# Patient Record
Sex: MALE | Race: AMERICAN INDIAN OR ALASKA NATIVE
[De-identification: names, ages, dates, MRNs, and addresses within clinical notes are randomized per-mention and may not be internally consistent; named-entity substitution may affect disease eponyms.]

---

## 2017-11-29 ENCOUNTER — HOSPITAL ENCOUNTER (INPATIENT)
Dept: HOSPITAL 5 - ED | Age: 47
LOS: 3 days | Discharge: HOME HEALTH | DRG: 286 | End: 2017-12-02
Attending: INTERNAL MEDICINE | Admitting: INTERNAL MEDICINE
Payer: COMMERCIAL

## 2017-11-29 DIAGNOSIS — E11.22: ICD-10-CM

## 2017-11-29 DIAGNOSIS — N17.0: ICD-10-CM

## 2017-11-29 DIAGNOSIS — Z79.899: ICD-10-CM
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
## 2017-11-29 DIAGNOSIS — N30.00: ICD-10-CM

## 2017-11-29 DIAGNOSIS — Z88.5: ICD-10-CM

## 2017-11-29 DIAGNOSIS — K86.1: ICD-10-CM

## 2017-11-29 DIAGNOSIS — T82.594A: Primary | ICD-10-CM

## 2017-11-29 DIAGNOSIS — Z71.51: ICD-10-CM

## 2017-11-29 DIAGNOSIS — A41.9: ICD-10-CM

## 2017-11-29 DIAGNOSIS — E11.65: ICD-10-CM

## 2017-11-29 DIAGNOSIS — Z95.4: ICD-10-CM
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
## 2017-11-29 DIAGNOSIS — E11.43: ICD-10-CM

## 2017-11-29 DIAGNOSIS — K65.8: ICD-10-CM

## 2017-11-29 DIAGNOSIS — K31.84: ICD-10-CM

## 2017-11-29 DIAGNOSIS — E43: ICD-10-CM

## 2017-11-29 DIAGNOSIS — D69.6: ICD-10-CM

## 2017-11-29 DIAGNOSIS — E88.09: ICD-10-CM

## 2017-11-29 DIAGNOSIS — J15.6: ICD-10-CM

## 2017-11-29 DIAGNOSIS — Z90.49: ICD-10-CM

## 2017-11-29 DIAGNOSIS — G89.4: ICD-10-CM

## 2017-11-29 DIAGNOSIS — D64.9: ICD-10-CM

## 2017-11-29 DIAGNOSIS — N18.9: ICD-10-CM

## 2017-11-29 LAB
ALBUMIN SERPL-MCNC: 2.7 G/DL (ref 3.9–5)
ALBUMIN/GLOB SERPL: 0.8 %
ALP SERPL-CCNC: 374 UNITS/L (ref 35–129)
ALT SERPL-CCNC: 21 UNITS/L (ref 7–56)
ANION GAP SERPL CALC-SCNC: 23 MMOL/L
ANISOCYTOSIS BLD QL SMEAR: (no result)
APTT BLD: 34.8 SEC. (ref 24.2–36.6)
BILIRUB SERPL-MCNC: 0.8 MG/DL (ref 0.1–1.2)
BLASTOCYTES % (MANUAL): 0 %
BUN SERPL-MCNC: 34 MG/DL (ref 9–20)
BUN/CREAT SERPL: 18 %
CALCIUM SERPL-MCNC: 7.7 MG/DL (ref 8.4–10.2)
CHLORIDE SERPL-SCNC: 101 MMOL/L (ref 98–107)
CK SERPL-CCNC: 29 UNITS/L (ref 55–170)
CO2 SERPL-SCNC: 20 MMOL/L (ref 22–30)
GLUCOSE SERPL-MCNC: 119 MG/DL (ref 75–100)
HCT VFR BLD CALC: 24.5 % (ref 35.5–45.6)
HGB BLD-MCNC: 7.8 GM/DL (ref 11.8–15.2)
INR PPP: 1.34 (ref 0.87–1.13)
LIPASE SERPL-CCNC: 4 UNITS/L (ref 13–60)
MCH RBC QN AUTO: 27 PG (ref 28–32)
MCHC RBC AUTO-ENTMCNC: 32 % (ref 32–34)
MCV RBC AUTO: 84 FL (ref 84–94)
OVALOCYTES BLD QL SMEAR: (no result)
PLATELET # BLD: 127 K/MM3 (ref 140–440)
POIKILOCYTOSIS BLD QL SMEAR: (no result)
POTASSIUM SERPL-SCNC: 4.5 MMOL/L (ref 3.6–5)
PROT SERPL-MCNC: 6.2 G/DL (ref 6.3–8.2)
RBC # BLD AUTO: 2.9 M/MM3 (ref 3.65–5.03)
SODIUM SERPL-SCNC: 139 MMOL/L (ref 137–145)
TOTAL CELLS COUNTED PERCENT: 0
WBC # BLD AUTO: 18.8 K/MM3 (ref 4.5–11)

## 2017-11-29 PROCEDURE — 80048 BASIC METABOLIC PNL TOTAL CA: CPT

## 2017-11-29 PROCEDURE — 93010 ELECTROCARDIOGRAM REPORT: CPT

## 2017-11-29 PROCEDURE — 89050 BODY FLUID CELL COUNT: CPT

## 2017-11-29 PROCEDURE — 81001 URINALYSIS AUTO W/SCOPE: CPT

## 2017-11-29 PROCEDURE — P9047 ALBUMIN (HUMAN), 25%, 50ML: HCPCS

## 2017-11-29 PROCEDURE — 85007 BL SMEAR W/DIFF WBC COUNT: CPT

## 2017-11-29 PROCEDURE — 71010: CPT

## 2017-11-29 PROCEDURE — C1751 CATH, INF, PER/CENT/MIDLINE: HCPCS

## 2017-11-29 PROCEDURE — 96375 TX/PRO/DX INJ NEW DRUG ADDON: CPT

## 2017-11-29 PROCEDURE — 96376 TX/PRO/DX INJ SAME DRUG ADON: CPT

## 2017-11-29 PROCEDURE — 84156 ASSAY OF PROTEIN URINE: CPT

## 2017-11-29 PROCEDURE — 96361 HYDRATE IV INFUSION ADD-ON: CPT

## 2017-11-29 PROCEDURE — 86140 C-REACTIVE PROTEIN: CPT

## 2017-11-29 PROCEDURE — 84100 ASSAY OF PHOSPHORUS: CPT

## 2017-11-29 PROCEDURE — 76770 US EXAM ABDO BACK WALL COMP: CPT

## 2017-11-29 PROCEDURE — 86225 DNA ANTIBODY NATIVE: CPT

## 2017-11-29 PROCEDURE — 86160 COMPLEMENT ANTIGEN: CPT

## 2017-11-29 PROCEDURE — 84484 ASSAY OF TROPONIN QUANT: CPT

## 2017-11-29 PROCEDURE — C1769 GUIDE WIRE: HCPCS

## 2017-11-29 PROCEDURE — 36415 COLL VENOUS BLD VENIPUNCTURE: CPT

## 2017-11-29 PROCEDURE — 84165 PROTEIN E-PHORESIS SERUM: CPT

## 2017-11-29 PROCEDURE — 85025 COMPLETE CBC W/AUTO DIFF WBC: CPT

## 2017-11-29 PROCEDURE — 83036 HEMOGLOBIN GLYCOSYLATED A1C: CPT

## 2017-11-29 PROCEDURE — 85610 PROTHROMBIN TIME: CPT

## 2017-11-29 PROCEDURE — 82553 CREATINE MB FRACTION: CPT

## 2017-11-29 PROCEDURE — 82140 ASSAY OF AMMONIA: CPT

## 2017-11-29 PROCEDURE — 83735 ASSAY OF MAGNESIUM: CPT

## 2017-11-29 PROCEDURE — 82570 ASSAY OF URINE CREATININE: CPT

## 2017-11-29 PROCEDURE — 87806 HIV AG W/HIV1&2 ANTB W/OPTIC: CPT

## 2017-11-29 PROCEDURE — 80053 COMPREHEN METABOLIC PANEL: CPT

## 2017-11-29 PROCEDURE — 96374 THER/PROPH/DIAG INJ IV PUSH: CPT

## 2017-11-29 PROCEDURE — 93005 ELECTROCARDIOGRAM TRACING: CPT

## 2017-11-29 PROCEDURE — 80202 ASSAY OF VANCOMYCIN: CPT

## 2017-11-29 PROCEDURE — 83690 ASSAY OF LIPASE: CPT

## 2017-11-29 PROCEDURE — 85730 THROMBOPLASTIN TIME PARTIAL: CPT

## 2017-11-29 PROCEDURE — 80074 ACUTE HEPATITIS PANEL: CPT

## 2017-11-29 PROCEDURE — 85027 COMPLETE CBC AUTOMATED: CPT

## 2017-11-29 PROCEDURE — 84295 ASSAY OF SERUM SODIUM: CPT

## 2017-11-29 PROCEDURE — 74176 CT ABD & PELVIS W/O CONTRAST: CPT

## 2017-11-29 PROCEDURE — 82565 ASSAY OF CREATININE: CPT

## 2017-11-29 PROCEDURE — 82962 GLUCOSE BLOOD TEST: CPT

## 2017-11-29 PROCEDURE — 36581 REPLACE TUNNELED CV CATH: CPT

## 2017-11-29 PROCEDURE — 87040 BLOOD CULTURE FOR BACTERIA: CPT

## 2017-11-29 PROCEDURE — 82550 ASSAY OF CK (CPK): CPT

## 2017-11-29 PROCEDURE — 84300 ASSAY OF URINE SODIUM: CPT

## 2017-11-29 PROCEDURE — 77001 FLUOROGUIDE FOR VEIN DEVICE: CPT

## 2017-11-29 PROCEDURE — 93306 TTE W/DOPPLER COMPLETE: CPT

## 2017-11-29 RX ADMIN — HEPARIN SODIUM SCH: 5000 INJECTION, SOLUTION INTRAVENOUS; SUBCUTANEOUS at 22:45

## 2017-11-29 RX ADMIN — PIPERACILLIN SODIUM AND TAZOBACTAM SODIUM SCH MLS/HR: 3; .375 INJECTION, POWDER, LYOPHILIZED, FOR SOLUTION INTRAVENOUS at 17:58

## 2017-11-29 RX ADMIN — ONDANSETRON PRN MG: 2 INJECTION INTRAMUSCULAR; INTRAVENOUS at 18:17

## 2017-11-29 RX ADMIN — HYDROMORPHONE HYDROCHLORIDE PRN MG: 2 INJECTION, SOLUTION INTRAMUSCULAR; INTRAVENOUS; SUBCUTANEOUS at 17:57

## 2017-11-29 RX ADMIN — HYDROMORPHONE HYDROCHLORIDE PRN MG: 2 INJECTION, SOLUTION INTRAMUSCULAR; INTRAVENOUS; SUBCUTANEOUS at 22:45

## 2017-11-29 NOTE — XRAY REPORT
PORTABLE CHEST



INDICATION: Fever, cough.



COMPARISON: 6/24/2012



FINDINGS: Portable, frontal chest radiograph again demonstrates 

sternotomy wires and prosthetic heart valve with a new right sided 

central catheter tip about the cavoatrial junction.  Top normal heart 

size with grossly normal mediastinal and hilar contours.  Predominantly 

bibasilar pulmonary opacities/infiltrates also new.  Clear upper lungs 

without significant pleural effusions or CHF.  EKG leads.  Intact bones.



CONCLUSION: New bibasilar opacities/infiltrates and right sided central 

catheter in this patient with stable prosthetic heart valve, as 

described.



Thank you for the opportunity to participate in this patient's care.

## 2017-11-29 NOTE — CAT SCAN REPORT
FINAL REPORT



PROCEDURE:  CT ABDOMEN PELVIS WO CON



TECHNIQUE:  Computerized axial tomography of the abdomen and

pelvis was performed without intravenous contrast. This study is

performed without intravascular contrast material and its

sensitivity for abdominal and pelvic pathology, including

neoplasms, inflammation, abscess, free fluid, thrombosis,

arterial dissection and infarction, is reduced compared with a

contrast enhanced study. 



HISTORY:  diffuse abdominal pain, fever 



COMPARISON:  No prior studies are available for comparison.



FINDINGS:  

Lower Lung fields: There are diffuse patchy alveolar densities

present in both lower lobes.. Small bilateral pleural effusions

appears to be present. These patchy alveolar densities suggest

pneumonia. Some of the densities could represent atelectasis.

Sternotomy wires are visualized. There appears to be a small

pericardial effusion. 



Upper Abdomen: Percutaneous gastrostomy tube visualized. The

inflated into the balloon is located in the lumen of the stomach.

Gallbladder appears to be surgically absent. The unenhanced

images of the liver are unremarkable. The adrenal glands are

unremarkable. The spleen does not appear to be enlarged. There

appear to be multiple surgical clips involving portions of the

pancreatic tail and body of the pancreas. The edges of the

pancreas are ill-defined. There is diffuse increased density

throughout the retroperitoneum surrounding the pancreas and

extending throughout the mesentery. This appears represent

diffuse edema. I cannot exclude pancreatitis. 



Kidneys, Ureters and Urinary bladder: No abnormalities are

identified.



Retroperitoneum: Abdominal aorta appears normal.



Nonspecific subcentimeter lymph nodes are seen in the

retroperitoneum. No pathologically enlarged lymph nodes are

identified. 



Bowel: Nonspecific bowel gas pattern present. No evidence of

bowel obstruction. Small amount of ascites is scattered

throughout the leaves of the mesentery. No free intraperitoneal

gas is seen. No focal bowel loop abnormalities are identified.

The appendix is not visualized.



Reproductive organs: Prostate gland does not appear to be

enlarged.



Other: No acute bony abnormalities are identified. 







IMPRESSION:  





Diffuse increased density seen in the mesentery as well as a

small amount of scattered ascites and ill definition of the

pancreas. Numerous surgical clips appear to be associated with

the pancreatic body and tail. Consider pancreatitis. No

pseudocyst is visualized.



Small bilateral pleural effusions  present.



Diffuse patchy alveolar densities present in both lower lobes

suggesting pneumonia. Some of this could represent atelectasis.



Percutaneous gastrostomy tube in place as described.



Prior thoracotomy. 



.

## 2017-11-29 NOTE — EMERGENCY DEPARTMENT REPORT
HPI





- General


Chief Complaint: Fever


Time Seen by Provider: 11/29/17 12:37





- HPI


HPI: 





Room 22





The patient is a 47-year-old male presenting with a chief complaint of 

abdominal pain.  The patient states for the past 3 days he is periumbilical 

abdominal pain as being constant and sharp in nature.  Patient admits to nausea 

and vomiting and occasional diarrhea.  Patient missed a subjective fever and 

occasional cough is sometimes productive of sputum.  Patient denies dysuria or 

hematuria.  Patient states he was admitted at [facility] 

and discharged 4 days ago on home IV antibiotics.  Patient states he is 

uncertain of the type of infection he was being treated for





Location: Gastrointestinal system


Duration: 3 days


Quality: Pain


Severity: Moderate


Modifying factors: [see above]


Context: [see above]


Mode of transportation: [not driving]





ED Past Medical Hx





- Past Medical History


Hx Hypertension: Yes


Hx Diabetes: Yes (insulin pump)


Additional medical history: gastroparesis





- Surgical History


Hx Open Heart Surgery: Yes


Hx Cholecystectomy: Yes


Additional Surgical History: abdominal surgery for malrotation, tricuspid valve 

replacement 2008, cervical fusion, partial gastrectomy "because of 

gastroparesis."





- Family History


Family history: no significant





- Social History


Smoking Status: Former Smoker (none 15 years)


Substance Use Type: None (denies illicit drug use)





- Medications


Home Medications: 


 Home Medications











 Medication  Instructions  Recorded  Confirmed  Last Taken  Type


 


Pregabalin [Lyrica] 75 mg PO QDAY 01/26/17 03/18/17 Unknown History


 


Scopolamine [Transderm-Scop] 1 each TD ONCE 01/26/17 03/18/17 Unknown History


 


Pantoprazole [Protonix TAB] 40 mg PO QDAY #30 tablet 01/29/17 03/18/17 Unknown 

Rx


 


Diazepam Tab [Valium] 5 mg PO QID PRN #30 tablet 03/21/17  Unknown Rx


 


Hydromorphone HCl [Dilaudid] 4 mg PO Q6H #30 tablet 03/21/17  Unknown Rx


 


Insulin Detemir [Levemir] 15 units SUB-Q QHS #1 vial 03/21/17  Unknown Rx


 


Insulin Regular, Human [HumuLIN R] See Protocol SUB-Q ACHS #1 vial 03/21/17  

Unknown Rx


 


Lisinopril [Zestril TAB] 10 mg PO QDAY #30 tab 03/21/17 03/18/17 Unknown Rx


 


Ondansetron [Zofran ODT TAB] 4 mg PO Q8HR #90 tab.rapdis 03/21/17  Unknown Rx


 


Promethazine [Phenergan SUPPOS] 80 mg LA DAILY #60 supp.rect 03/21/17  Unknown 

Rx














ED Review of Systems


ROS: 


Stated complaint: ABD PAIN


Other details as noted in HPI





Constitutional: fever (subjective)


Eyes: denies: eye pain


ENT: denies: ear pain


Cardiovascular: denies: chest pain


Gastrointestinal: abdominal pain, nausea, vomiting, diarrhea


Genitourinary: denies: dysuria, hematuria


Musculoskeletal: denies: back pain


Neurological: denies: headache





Physical Exam





- Physical Exam


Vital Signs: 


 Vital Signs











  11/29/17





  12:11


 


Temperature 103.1 F H


 


Pulse Rate 118 H


 


Respiratory 28 H





Rate 


 


Blood Pressure 143/75


 


Blood Pressure 143/75





[Left] 


 


O2 Sat by Pulse 99





Oximetry 











Physical Exam: 





GENERAL: The patient is well-developed well-nourished male lying on stretcher 

not appearing to be in acute distress. []


HEENT: Normocephalic.  Atraumatic.  Extraocular motions are intact.  Patient 

has moist mucous membranes.


NECK: Supple.  Trachea midline


CHEST/LUNGS: Clear to auscultation.  There is no respiratory distress noted.


HEART/CARDIOVASCULAR: Regular.  There is tachycardia.  There is no gallop rub 

or murmur.


ABDOMEN: Abdomen is soft, with tenderness to palpation in the right upper 

quadrant and right lower quadrant.  There is no rebound or guarding.  Patient 

has normal bowel sounds.  There is no abdominal distention.


SKIN: There is no rash.  There is no edema.  There is no diaphoresis.


NEURO: The patient is awake, alert, and oriented.  The patient is cooperative.  

The patient has normal speech 


MUSCULOSKELETAL:  There is no evidence of acute injury.





ED Course


 Vital Signs











  11/29/17





  12:11


 


Temperature 103.1 F H


 


Pulse Rate 118 H


 


Respiratory 28 H





Rate 


 


Blood Pressure 143/75


 


Blood Pressure 143/75





[Left] 


 


O2 Sat by Pulse 99





Oximetry 














ED Medical Decision Making





- Lab Data


Result diagrams: 


 11/29/17 13:06





 11/29/17 13:06





 Laboratory Tests











  11/29/17 11/29/17 11/29/17





  13:05 13:05 13:05


 


WBC   


 


RBC   


 


Hgb   


 


Hct   


 


MCV   


 


MCH   


 


MCHC   


 


RDW   


 


Plt Count   


 


Add Manual Diff   


 


Total Counted   


 


Seg Neutrophils %   


 


Seg Neuts % (Manual)   


 


Band Neutrophils %   


 


Lymphocytes % (Manual)   


 


Reactive Lymphs % (Man)   


 


Monocytes % (Manual)   


 


Eosinophils % (Manual)   


 


Basophils % (Manual)   


 


Metamyelocytes %   


 


Myelocytes %   


 


Promyelocytes %   


 


Blast Cells %   


 


Nucleated RBC %   


 


Seg Neutrophils # Man   


 


Band Neutrophils #   


 


Lymphocytes # (Manual)   


 


Abs React Lymphs (Man)   


 


Monocytes # (Manual)   


 


Eosinophils # (Manual)   


 


Basophils # (Manual)   


 


Metamyelocytes #   


 


Myelocytes #   


 


Promyelocytes #   


 


Blast Cells #   


 


WBC Morphology   


 


Hypersegmented Neuts   


 


Hyposegmented Neuts   


 


Hypogranular Neuts   


 


Smudge Cells   


 


Toxic Granulation   


 


Toxic Vacuolation   


 


Dohle Bodies   


 


Pelger-Huet Anomaly   


 


Natalie Rods   


 


Platelet Estimate   


 


Clumped Platelets   


 


Plt Clumps, EDTA   


 


Large Platelets   


 


Giant Platelets   


 


Platelet Satelliting   


 


Plt Morphology Comment   


 


RBC Morphology   


 


Dimorphic RBCs   


 


Polychromasia   


 


Hypochromasia   


 


Poikilocytosis   


 


Anisocytosis   


 


Microcytosis   


 


Macrocytosis   


 


Spherocytes   


 


Pappenheimer Bodies   


 


Sickle Cells   


 


Target Cells   


 


Tear Drop Cells   


 


Ovalocytes   


 


Helmet Cells   


 


[place] Bodies   


 


[place]ot Rings   


 


Tammy Cells   


 


Bite Cells   


 


Crenated Cell   


 


Elliptocytes   


 


Acanthocytes (Spur)   


 


Rouleaux   


 


Hemoglobin C Crystals   


 


Schistocytes   


 


Malaria parasites   


 


Luis Bodies   


 


Hem Pathologist Commnt   


 


PT  17.2 H  


 


INR  1.34 H  


 


APTT  34.8  


 


Sodium   


 


Potassium   


 


Chloride   


 


Carbon Dioxide   


 


Anion Gap   


 


BUN   


 


Creatinine   


 


Estimated GFR   


 


BUN/Creatinine Ratio   


 


Glucose   


 


POC Glucose   


 


Lactic Acid   


 


Calcium   


 


Total Bilirubin   


 


AST   


 


ALT   


 


Alkaline Phosphatase   


 


Total Creatine Kinase   29 L 


 


CK-MB (CK-2)   < 1.0 


 


CK-MB (CK-2) Rel Index   3.4 


 


Troponin T    0.021


 


Total Protein   


 


Albumin   


 


Albumin/Globulin Ratio   


 


Lipase   4 L 


 


Random Vancomycin   














  11/29/17 11/29/17 11/29/17





  13:05 13:06 13:06


 


WBC   18.8 H 


 


RBC   2.90 L 


 


Hgb   7.8 L 


 


Hct   24.5 L 


 


MCV   84 


 


MCH   27 L 


 


MCHC   32 


 


RDW   18.6 H 


 


Plt Count   127 L 


 


Add Manual Diff   Complete 


 


Total Counted   100 


 


Seg Neutrophils %   Np 


 


Seg Neuts % (Manual)   93.0 H 


 


Band Neutrophils %   3.0 


 


Lymphocytes % (Manual)   4.0 L 


 


Reactive Lymphs % (Man)   0 


 


Monocytes % (Manual)   0 


 


Eosinophils % (Manual)   0 


 


Basophils % (Manual)   0 


 


Metamyelocytes %   0 


 


Myelocytes %   0 


 


Promyelocytes %   0 


 


Blast Cells %   0 


 


Nucleated RBC %   Not Reportable 


 


Seg Neutrophils # Man   17.5 H 


 


Band Neutrophils #   0.6 


 


Lymphocytes # (Manual)   0.8 L 


 


Abs React Lymphs (Man)   0.0 


 


Monocytes # (Manual)   0.0 


 


Eosinophils # (Manual)   0.0 


 


Basophils # (Manual)   0.0 


 


Metamyelocytes #   0.0 


 


Myelocytes #   0.0 


 


Promyelocytes #   0.0 


 


Blast Cells #   0.0 


 


WBC Morphology   Not Reportable 


 


Hypersegmented Neuts   Not Reportable 


 


Hyposegmented Neuts   Not Reportable 


 


Hypogranular Neuts   Not Reportable 


 


Smudge Cells   Not Reportable 


 


Toxic Granulation   Not Reportable 


 


Toxic Vacuolation   Not Reportable 


 


Dohle Bodies   Not Reportable 


 


Pelger-Huet Anomaly   Not Reportable 


 


Natalie Rods   Not Reportable 


 


Platelet Estimate   Appears normal 


 


Clumped Platelets   Not Reportable 


 


Plt Clumps, EDTA   Not Reportable 


 


Large Platelets   Not Reportable 


 


Giant Platelets   Not Reportable 


 


Platelet Satelliting   Not Reportable 


 


Plt Morphology Comment   Not Reportable 


 


RBC Morphology   Not Reportable 


 


Dimorphic RBCs   Not Reportable 


 


Polychromasia   Few 


 


Hypochromasia   Not Reportable 


 


Poikilocytosis   1+ 


 


Anisocytosis   1+ 


 


Microcytosis   Not Reportable 


 


Macrocytosis   Not Reportable 


 


Spherocytes   Not Reportable 


 


Pappenheimer Bodies   Not Reportable 


 


Sickle Cells   Not Reportable 


 


Target Cells   Not Reportable 


 


Tear Drop Cells   Few 


 


Ovalocytes   1+ 


 


Helmet Cells   Rare 


 


[place] Bodies   Not Reportable 


 


Cabot Rings   Not Reportable 


 


Tammy Cells   Few 


 


Bite Cells   Not Reportable 


 


Crenated Cell   Not Reportable 


 


Elliptocytes   Few 


 


Acanthocytes (Spur)   Not Reportable 


 


Rouleaux   Not Reportable 


 


Hemoglobin C Crystals   Not Reportable 


 


Schistocytes   Rare 


 


Malaria parasites   Not Reportable 


 


Luis Bodies   Not Reportable 


 


Hem Pathologist Commnt   No 


 


PT   


 


INR   


 


APTT   


 


Sodium    139


 


Potassium    4.5


 


Chloride    101.0


 


Carbon Dioxide    20 L


 


Anion Gap    23


 


BUN    34 H


 


Creatinine    1.9 H


 


Estimated GFR    46


 


BUN/Creatinine Ratio    18


 


Glucose    119 H


 


POC Glucose   


 


Lactic Acid   


 


Calcium    7.7 L


 


Total Bilirubin    0.80


 


AST    25


 


ALT    21


 


Alkaline Phosphatase    374 H


 


Total Creatine Kinase   


 


CK-MB (CK-2)   


 


CK-MB (CK-2) Rel Index   


 


Troponin T   


 


Total Protein    6.2 L


 


Albumin    2.7 L


 


Albumin/Globulin Ratio    0.8


 


Lipase   


 


Random Vancomycin  10.1  














  11/29/17 11/29/17





  14:16 15:21


 


WBC  


 


RBC  


 


Hgb  


 


Hct  


 


MCV  


 


MCH  


 


MCHC  


 


RDW  


 


Plt Count  


 


Add Manual Diff  


 


Total Counted  


 


Seg Neutrophils %  


 


Seg Neuts % (Manual)  


 


Band Neutrophils %  


 


Lymphocytes % (Manual)  


 


Reactive Lymphs % (Man)  


 


Monocytes % (Manual)  


 


Eosinophils % (Manual)  


 


Basophils % (Manual)  


 


Metamyelocytes %  


 


Myelocytes %  


 


Promyelocytes %  


 


Blast Cells %  


 


Nucleated RBC %  


 


Seg Neutrophils # Man  


 


Band Neutrophils #  


 


Lymphocytes # (Manual)  


 


Abs React Lymphs (Man)  


 


Monocytes # (Manual)  


 


Eosinophils # (Manual)  


 


Basophils # (Manual)  


 


Metamyelocytes #  


 


Myelocytes #  


 


Promyelocytes #  


 


Blast Cells #  


 


WBC Morphology  


 


Hypersegmented Neuts  


 


Hyposegmented Neuts  


 


Hypogranular Neuts  


 


Smudge Cells  


 


Toxic Granulation  


 


Toxic Vacuolation  


 


Dohle Bodies  


 


Pelger-Huet Anomaly  


 


Natalie Rods  


 


Platelet Estimate  


 


Clumped Platelets  


 


Plt Clumps, EDTA  


 


Large Platelets  


 


Giant Platelets  


 


Platelet Satelliting  


 


Plt Morphology Comment  


 


RBC Morphology  


 


Dimorphic RBCs  


 


Polychromasia  


 


Hypochromasia  


 


Poikilocytosis  


 


Anisocytosis  


 


Microcytosis  


 


Macrocytosis  


 


Spherocytes  


 


Pappenheimer Bodies  


 


Sickle Cells  


 


Target Cells  


 


Tear Drop Cells  


 


Ovalocytes  


 


Helmet Cells  


 


[place] Bodies  


 


Cabot Rings  


 


[place] Cells  


 


Bite Cells  


 


Crenated Cell  


 


Elliptocytes  


 


Acanthocytes (Spur)  


 


Rouleaux  


 


Hemoglobin C Crystals  


 


Schistocytes  


 


Malaria parasites  


 


Luis Bodies  


 


Hem Pathologist Commnt  


 


PT  


 


INR  


 


APTT  


 


Sodium  


 


Potassium  


 


Chloride  


 


Carbon Dioxide  


 


Anion Gap  


 


BUN  


 


Creatinine  


 


Estimated GFR  


 


BUN/Creatinine Ratio  


 


Glucose  


 


POC Glucose  118 H 


 


Lactic Acid   1.20


 


Calcium  


 


Total Bilirubin  


 


AST  


 


ALT  


 


Alkaline Phosphatase  


 


Total Creatine Kinase  


 


CK-MB (CK-2)  


 


CK-MB (CK-2) Rel Index  


 


Troponin T  


 


Total Protein  


 


Albumin  


 


Albumin/Globulin Ratio  


 


Lipase  


 


Random Vancomycin  














- EKG Data


-: EKG Interpreted by Me


EKG shows normal: sinus rhythm


Rate: tachycardia (115 bpm)





- EKG Data


When compared to previous EKG there are: previous EKG unavailable


Interpretation: nonspecific ST-T wave nadir (T-wave inversion in lead 3, aVF, V3, 

V4)





- Radiology Data


Radiology results: image reviewed (chest x-ray, CT abdomen and pelvis)


interpreted by me: 





Chest x-ray-bibasilar infiltrates, no pneumothorax





CT abdomen and pelvis (discussed with radiologist)-no acute intra-abdominal 

findings.  Diffuse ascites, bibasilar infiltrates





- Differential Diagnosis


pyelonephritis, gastroparesis, intra-abdominal abscess,


Critical care attestation.: 


If time is entered above; I have spent that time in minutes in the direct care 

of this critically ill patient, excluding procedure time.








ED Disposition


Clinical Impression: 


 Nausea & vomiting, Gastroparesis, Pneumonia, Fever, Tachycardia, Renal 

insufficiency





Disposition: DC-09 OP ADMIT IP TO THIS HOSP


Is pt being admited?: Yes


Does the pt Need Aspirin: No


Condition: Stable


Instructions:  Bacterial Pneumonia (ED)


Referrals: 


PRIMARY CARE,MD [Primary Care Provider] - 3-5 Days


Time of Disposition: 15:57 (hospitalist Dr Pete notified)

## 2017-11-30 LAB
ANION GAP SERPL CALC-SCNC: 20 MMOL/L
BUN SERPL-MCNC: 34 MG/DL (ref 9–20)
BUN/CREAT SERPL: 18 %
CALCIUM SERPL-MCNC: 7.6 MG/DL (ref 8.4–10.2)
CHLORIDE SERPL-SCNC: 102 MMOL/L (ref 98–107)
CO2 SERPL-SCNC: 21 MMOL/L (ref 22–30)
GLUCOSE SERPL-MCNC: 259 MG/DL (ref 75–100)
HCT VFR BLD CALC: 24.1 % (ref 35.5–45.6)
HGB BLD-MCNC: 7.8 GM/DL (ref 11.8–15.2)
MAGNESIUM SERPL-MCNC: 1.8 MG/DL (ref 1.7–2.3)
MCH RBC QN AUTO: 27 PG (ref 28–32)
MCHC RBC AUTO-ENTMCNC: 32 % (ref 32–34)
MCV RBC AUTO: 85 FL (ref 84–94)
PHOSPHATE SERPL-MCNC: 3.5 MG/DL (ref 2.5–4.5)
PLATELET # BLD: 121 K/MM3 (ref 140–440)
POTASSIUM SERPL-SCNC: 4.9 MMOL/L (ref 3.6–5)
RBC # BLD AUTO: 2.84 M/MM3 (ref 3.65–5.03)
SODIUM SERPL-SCNC: 138 MMOL/L (ref 137–145)
WBC # BLD AUTO: 11 K/MM3 (ref 4.5–11)

## 2017-11-30 PROCEDURE — 0DH63UZ INSERTION OF FEEDING DEVICE INTO STOMACH, PERCUTANEOUS APPROACH: ICD-10-PCS | Performed by: INTERNAL MEDICINE

## 2017-11-30 RX ADMIN — PIPERACILLIN SODIUM AND TAZOBACTAM SODIUM SCH MLS/HR: 3; .375 INJECTION, POWDER, LYOPHILIZED, FOR SOLUTION INTRAVENOUS at 15:09

## 2017-11-30 RX ADMIN — LISINOPRIL SCH MG: 5 TABLET ORAL at 09:48

## 2017-11-30 RX ADMIN — VANCOMYCIN HYDROCHLORIDE SCH MLS/HR: 1 INJECTION, POWDER, LYOPHILIZED, FOR SOLUTION INTRAVENOUS at 17:41

## 2017-11-30 RX ADMIN — HYDROMORPHONE HYDROCHLORIDE PRN MG: 2 INJECTION, SOLUTION INTRAMUSCULAR; INTRAVENOUS; SUBCUTANEOUS at 15:08

## 2017-11-30 RX ADMIN — SODIUM CHLORIDE SCH MLS/HR: 0.9 INJECTION, SOLUTION INTRAVENOUS at 05:12

## 2017-11-30 RX ADMIN — HYDROMORPHONE HYDROCHLORIDE PRN MG: 2 INJECTION, SOLUTION INTRAMUSCULAR; INTRAVENOUS; SUBCUTANEOUS at 02:32

## 2017-11-30 RX ADMIN — INSULIN DETEMIR SCH UNITS: 100 INJECTION, SOLUTION SUBCUTANEOUS at 22:47

## 2017-11-30 RX ADMIN — HEPARIN SODIUM SCH: 5000 INJECTION, SOLUTION INTRAVENOUS; SUBCUTANEOUS at 10:00

## 2017-11-30 RX ADMIN — HEPARIN SODIUM SCH: 5000 INJECTION, SOLUTION INTRAVENOUS; SUBCUTANEOUS at 22:47

## 2017-11-30 RX ADMIN — HYDROMORPHONE HYDROCHLORIDE PRN MG: 2 INJECTION, SOLUTION INTRAMUSCULAR; INTRAVENOUS; SUBCUTANEOUS at 08:07

## 2017-11-30 RX ADMIN — PIPERACILLIN SODIUM AND TAZOBACTAM SODIUM SCH MLS/HR: 3; .375 INJECTION, POWDER, LYOPHILIZED, FOR SOLUTION INTRAVENOUS at 20:42

## 2017-11-30 RX ADMIN — PIPERACILLIN SODIUM AND TAZOBACTAM SODIUM SCH MLS/HR: 3; .375 INJECTION, POWDER, LYOPHILIZED, FOR SOLUTION INTRAVENOUS at 00:55

## 2017-11-30 RX ADMIN — HYDROMORPHONE HYDROCHLORIDE PRN MG: 2 INJECTION, SOLUTION INTRAMUSCULAR; INTRAVENOUS; SUBCUTANEOUS at 12:16

## 2017-11-30 RX ADMIN — PIPERACILLIN SODIUM AND TAZOBACTAM SODIUM SCH MLS/HR: 3; .375 INJECTION, POWDER, LYOPHILIZED, FOR SOLUTION INTRAVENOUS at 05:16

## 2017-11-30 RX ADMIN — PREGABALIN SCH MG: 75 CAPSULE ORAL at 09:48

## 2017-11-30 RX ADMIN — HYDROMORPHONE HYDROCHLORIDE PRN MG: 2 INJECTION, SOLUTION INTRAMUSCULAR; INTRAVENOUS; SUBCUTANEOUS at 20:36

## 2017-11-30 RX ADMIN — PIPERACILLIN SODIUM AND TAZOBACTAM SODIUM SCH: 3; .375 INJECTION, POWDER, LYOPHILIZED, FOR SOLUTION INTRAVENOUS at 23:56

## 2017-11-30 RX ADMIN — HYDROMORPHONE HYDROCHLORIDE PRN MG: 2 INJECTION, SOLUTION INTRAMUSCULAR; INTRAVENOUS; SUBCUTANEOUS at 17:28

## 2017-11-30 RX ADMIN — SODIUM CHLORIDE SCH MLS/HR: 0.9 INJECTION, SOLUTION INTRAVENOUS at 09:47

## 2017-11-30 NOTE — GASTROENTEROLOGY CONSULTATION
History of Present Illness





- Reason for Consult


Consult date: 11/30/17


abdominal pain


Requesting physician: ISA PIERRE





- History of Present Illness


Mr Solis is a 46 yo aam with h/o poorly controlled diabetes and gastroparesis 

who presents with worsening abdominal pain, n/v, and diffuse body aches.  

Patient reports initially having symptoms of URI 2-3 weeks ago.  He reports 

having chronic abdominal pain for over a decade with associated n/v.  He has 

symptoms at baseline, but has periods of worsening symptoms.  Of note, patient 

recently was at [facility] for DKA and sepsis (2/2 tpn line induced bacteremia).  

Previously plans for gastroparesis included TPN, feeding tube (which he has), 

dm management, and to f/u with specialist at [facility].  He reports abd pain being 

diffuse, worsened with meals.  He denies constipation or bowel habit changes.  

Of note, pt reports having abdominal surgery at [facility] [facility] several months ago with 

"parts of intestine removed" although he is unable to provide further details 

regarding this.





Past History


Past Medical History: diabetes (30+ years), hypertension, other (diabetic 

gastroparesis)


Past Surgical History: No surgical history


Social history: other (he is an ex-smoker.  Denies alcohol abuse or drug abuse)


Family history: other (patient states that his father also had kidney disease 

but not on dialysis)





Medications and Allergies


 Allergies











Allergy/AdvReac Type Severity Reaction Status Date / Time


 


codeine Allergy  Unknown Verified 01/26/17 12:42











 Home Medications











 Medication  Instructions  Recorded  Confirmed  Last Taken  Type


 


Pregabalin [Lyrica] 75 mg PO QDAY 01/26/17 11/29/17 Unknown History


 


Diazepam Tab [Valium] 5 mg PO QID PRN #30 tablet 03/21/17 11/29/17 Unknown Rx


 


Hydromorphone HCl [Dilaudid] 4 mg PO Q6H #30 tablet 03/21/17 11/29/17 Unknown Rx


 


Insulin Regular, Human [HumuLIN R] See Protocol SUB-Q ACHS #1 vial 03/21/17 11/ 29/17 Unknown Rx


 


Ondansetron [Zofran ODT TAB] 4 mg PO Q8HR #90 tab.rapdis 03/21/17 11/29/17 

Unknown Rx


 


Ciprofloxacin HCl [Ciprofloxacin 500 mg PO Q12H 11/29/17 11/29/17 Unknown 

History





TAB]     


 


Lisinopril [Zestril TAB] 2.5 mg PO QDAY 11/29/17 11/29/17 Unknown History











Active Meds: 


Active Medications





Acetaminophen (Tylenol)  650 mg PO Q4H PRN


   PRN Reason: For Pain/Fever/Headache


Dextrose (D50w (25gm) Syringe)  50 ml IV PRN PRN


   PRN Reason: Hypoglycemia


Diazepam (Valium)  5 mg PO QID PRN


   PRN Reason: Anxiety


Heparin Sodium (Porcine) (Heparin)  5,000 unit SUB-Q Q12HR On license of [facility]


   Last Admin: 11/30/17 10:00 Dose:  Not Given


Hydromorphone HCl (Dilaudid)  1 mg IV Q3H PRN


   PRN Reason: Pain , Severe (7-10)


   Last Admin: 11/30/17 15:08 Dose:  1 mg


Piperacillin Sod/Tazobactam Sod (Zosyn/Ns 3.375gm/50ml)  3.375 gm in 50 mls @ 

100 mls/hr IV Q6H On license of [facility]


   PRN Reason: Protocol


   Last Admin: 11/30/17 15:09 Dose:  100 mls/hr


Amino Acids/Electrolytes/Dextrose (Tpn Adult)  1,800 mls @ 75 mls/hr IV DAILY@

2000 On license of [facility]


   PRN Reason: Protocol


   Stop: 12/01/17 19:59


Vancomycin HCl (Vancomycin/Ns 1 Gm/250 Ml)  1 gm in 250 mls @ 125 mls/hr IV 

Q12H On license of [facility]


Insulin Detemir (Levemir)  12 units SUB-Q QHS On license of [facility]


Insulin Human Regular (Novolin R)  0 units SUB-Q ACHS On license of [facility]


   PRN Reason: Protocol


   Last Admin: 11/30/17 12:51 Dose:  4 units


Lisinopril (Zestril)  2.5 mg PO QDAY On license of [facility]


   Last Admin: 11/30/17 09:48 Dose:  2.5 mg


Ondansetron HCl (Zofran)  4 mg IV Q6H PRN


   PRN Reason: Nausea And Vomiting


   Last Admin: 11/29/17 18:17 Dose:  4 mg


Pregabalin (Lyrica)  75 mg PO QDAY On license of [facility]


   Last Admin: 11/30/17 09:48 Dose:  75 mg


Vancomycin HCl (Vancomycin Pharmacy To Dose)  1 each IV PKCONSULT On license of [facility]


   PRN Reason: Protocol











Review of Systems





- Review of Systems


All systems: negative (per hpi)





Exam





- Constitutional


Vital Signs: 


 











Temp Pulse Resp BP Pulse Ox


 


 97.2 F L  80   18   111/69   97 


 


 11/30/17 15:06  11/30/17 15:06  11/30/17 15:06  11/30/17 15:06  11/30/17 15:06











General appearance: no acute distress





- EENT


Eyes: PERRL, EOM intact


ENT: hearing intact, clear oral mucosa





- Respiratory


Respiratory effort: normal


Respiratory: bilateral: CTA





- Cardiovascular


Rhythm: regular


Heart Sounds: Present: S1 & S2


Extremity abnormal: edema





- Gastrointestinal


General gastrointestinal: Present: soft, tender (diffuse ttp, no r/g), 

distended (mild distention), hypoactive bowel sounds, other (+ surgical scars, 

g tube site c/d/i)





- Neurologic


Neurological: alert and oriented x3





- Psychiatric


Psychiatric: appropriate mood/affect





- Labs


CBC & Chem 7: 


 11/30/17 06:12





 11/30/17 06:12


Lab Results: 


 Laboratory Results - last 24 hr











  11/29/17 11/29/17 11/30/17





  13:06 22:49 05:51


 


WBC   


 


RBC   


 


Hgb   


 


Hct   


 


MCV   


 


MCH   


 


MCHC   


 


RDW   


 


Plt Count   


 


Sodium   


 


Potassium   


 


Chloride   


 


Carbon Dioxide   


 


Anion Gap   


 


BUN   


 


Creatinine   


 


Estimated GFR   


 


BUN/Creatinine Ratio   


 


Glucose   


 


POC Glucose   221 H  279 H


 


Hemoglobin A1c   


 


Lactic Acid  1.10  


 


Calcium   


 


Phosphorus   


 


Magnesium   














  11/30/17 11/30/17 11/30/17





  06:12 06:12 06:12


 


WBC  11.0  


 


RBC  2.84 L  


 


Hgb  7.8 L  


 


Hct  24.1 L  


 


MCV  85  


 


MCH  27 L  


 


MCHC  32  


 


RDW  19.2 H  


 


Plt Count  121 L  


 


Sodium   138 


 


Potassium   4.9 


 


Chloride   102.0 


 


Carbon Dioxide   21 L 


 


Anion Gap   20 


 


BUN   34 H 


 


Creatinine   1.9 H 


 


Estimated GFR   46 


 


BUN/Creatinine Ratio   18 


 


Glucose   259 H 


 


POC Glucose   


 


Hemoglobin A1c    8.5 H


 


Lactic Acid   


 


Calcium   7.6 L 


 


Phosphorus   


 


Magnesium   














  11/30/17 11/30/17





  06:15 12:09


 


WBC  


 


RBC  


 


Hgb  


 


Hct  


 


MCV  


 


MCH  


 


MCHC  


 


RDW  


 


Plt Count  


 


Sodium  


 


Potassium  


 


Chloride  


 


Carbon Dioxide  


 


Anion Gap  


 


BUN  


 


Creatinine  


 


Estimated GFR  


 


BUN/Creatinine Ratio  


 


Glucose  


 


POC Glucose   276 H


 


Hemoglobin A1c  


 


Lactic Acid  


 


Calcium  


 


Phosphorus  3.50 


 


Magnesium  1.80 














- Imaging


CT Scan: report reviewed





Assessment and Plan


1. Abdominal pain + n/v - chronic for over a decade per pt, likely due to known 

diagnosis of diabetic gastroparesis. He was recently on TPN (stopped due to 

bacteremia), and has a G tube placed.  He is followed by specialist at [facility].  


-CT scan reviewed with incidental findings noted (no acute process); lipase 

normal and lft's mostly unremarkable (milk alk phos elevation)


-diabetes management per primary team


-consider trial of low dose marinol vs ativan (short term) with pt


-avoid narcotics


-nutrition consult regarding po intake vs tube feed recommendations


 2. Diabetes


3. CKD

## 2017-11-30 NOTE — EVENT NOTE
Date: 11/30/17


Consulted for lack of IV access.





Reviewed patient's CXR performed on 11/29/17. There is a right internal jugular 

tunneled dual lumen PICC line.





Contacted nurse who confirms dual lumen tunneled PICC line.





Recommend use of tunneled PICC line for IV access. 


Please contact Vascular/IR again if needed.

## 2017-11-30 NOTE — HISTORY AND PHYSICAL REPORT
CHIEF COMPLAINT:  Abdominal pain.



OTHER COMPLAINTS:  Include fever, nausea and vomiting.



HISTORY OF PRESENT ILLNESS:  The patient is a 47-year-old male who was recently

discharged from [facility] in [place] and was discharged on IV

antibiotics because of a suspected infection possibly at the IV access in the

right chest wall.  The patient presented with abdominal pain going on for about

3 days associated with nausea and vomiting and fever.  He has no history of

shortness of breath, no history of cough and no history of diarrhea.



PAST MEDICAL HISTORY:  Pertinent for hypertension; diabetes mellitus, on insulin

pump; gastroparesis.  Also, the patient has past history of bacteremia and

endocarditis.



PAST SURGICAL HISTORY:  Pertinent for abdominal surgery for malrotation,

tricuspid valve replacement.  Also the patient has past surgical history of

partial gastrectomy because of gastroparesis and cervical fusion as well as

tricuspid valve replacement.



FAMILY HISTORY:  Noncontributory.



SOCIAL HISTORY:  The patient is a former smoker.  Does not smoke currently, does

not drink alcohol, and does not use illicit drugs.



MEDICATIONS:  The patient is on Lyrica 75 mg daily, scopolamine transdermal

formula 1, TD  transdermal frequency unknown.  Pantoprazole 40 mg daily,

diazepam 5 mg p.o. t.i.d. p.r.n. anxiety, hydromorphone or Dilaudid 4 mg by

mouth every 6 hours, insulin Levemir 15 units subcutaneous at bedtime, insulin

regular Humulin using the sliding scale.  The patient is on lisinopril 10 mg by

mouth daily, Zofran 4 mg p.o. q. 8 hours and Phenergan 80 mg rectally daily as

needed for nausea and vomiting.



ALLERGIES:  The patient is allergic to CODEINE.



REVIEW OF SYSTEMS:

CONSTITUTIONAL:  There is fever, but no chills, no diaphoresis.

HEENT:  There is no headache or sore throat.

CARDIOVASCULAR SYSTEM:  There is no chest pain or orthopnea.

RESPIRATORY SYSTEM:  There is no shortness of breath or cough. 

GASTROINTESTINAL SYSTEM:  Abdominal pain present.  Nausea and vomiting present. 

No diarrhea.

NEUROLOGICAL:  There is no numbness, no dizziness, no altered mental status.

MUSCULOSKELETAL SYSTEM:  There is no joint pain or swelling.

DERMATOLOGICAL SYSTEM:  There is no skin rash or itching.

GENITOURINARY SYSTEM:  There is no dysuria, hematuria or flank pain.

Rest of the systems reviewed and is normal.



PHYSICAL EXAMINATION:

GENERAL:  At the time of exam, the patient was found to be alert, oriented x 3

and in mild distress due to abdominal pain.

VITAL SIGNS:  Shows normal temperature with pulse of 96, respirations 21, blood

pressure 122/70, O2 sat 99% on room air.

HEENT:  Showed pupils to be equal, round reacting to light and accommodation. 

Extraocular muscles are intact.

NECK:  Supple with no JVD or carotid bruit.

CARDIOVASCULAR SYSTEM:  Shows first and second heart sounds with no gallops or

murmur.

RESPIRATORY SYSTEM:  Show reduced air entry on both sides of the lung with no

abnormal breath sounds.

GASTROINTESTINAL SYSTEM:  Shows abdomen to be full, soft with generalized

tenderness with no guarding or rigidity.

NEUROLOGIC:  Shows no focal deficits.

MUSCULOSKELETAL SYSTEM:  Shows no joint swelling.

DERMATOLOGICAL SYSTEM:  Show no skin rash.

GENITOURINARY:  Showing no costovertebral angle tenderness.



PERTINENT LABORATORY AND IMAGING STUDIES:  The patient had CBC done that shows

elevated white count of 18,800 with low hemoglobin of 7.8 and low hematocrit of

24.5 with CBC differential showing elevated segmented neutrophil of 93%.  The

patient's coagulation studies showed elevated PT of 17.2 with elevated BUN of 34

and elevated creatinine of 1.9 and low GFR of 46.  The patient's cardiac enzymes

came back unremarkable.  Lipase level was unremarkable.



IMAGING STUDIES:  The patient had a CT of the abdomen and pelvis shows small

bilateral pleural effusions present and it shows some diffuse increased density

seen in the mesentery as well as a small amount of scattered ascites and _____

of the pancreas.  They say to consider pancreatitis.  Also, the CT abdomen and

pelvis showed diffuse patchy alveolar density present in both lower lobes

suggesting of pneumonia.



DIAGNOSES:

1.  Abdominal pain.

2.  Bilateral pneumonia.

3.  Anemia.

4.  Acute renal failure.

5.  Gastroparesis.

.



PLAN:  The patient will be admitted to medical floor and will be on IV Zosyn

3.375 g q. 8 hours.  This will be started after blood cultures are done.  The

patient will be on IV Dilaudid 1 mg every 3 hours as needed for pain, even

though the patient is ALLERGIC TO CODEINE, but he takes Dilaudid orally at home.

 The patient will be on IV normal saline running at 150 mL an hour and will be

IV Zofran 4 mg every 6 hours for nausea and vomiting.  The patient will be on

Accu-Chek a.c. and at bedtime followed by moderate dose of sliding scale using

regular insulin coverage.  The patient will be on consistent carbohydrate diet

with 2 g sodium diet well.  DVT prophylaxis will be through heparin 5000 units

subcutaneous q. 12 hours.  The patient's home medications will be started as

shown in the reconciliation section.  The patient will have CBC, basic metabolic

panel checked tomorrow morning and will have a Nephrology consult with Dr. Fernandez on 11/30/2017 for acute kidney injury.





DD: 11/29/2017 17:50

DT: 11/29/2017 19:46

JOB# [id number]  [id number]

OCN/NTS

MTDD

## 2017-11-30 NOTE — CONSULTATION
History of Present Illness





- Reason for Consult


Consult date: 11/30/17


sepsis


Requesting physician: ISA NEGRO





- History of Present Illness


47-year-old male with history of uncontrolled diabetes, gastroparesis, admitted 

on 11/29/2017 due to two-week history of on and off cough with yellowish sputum 

production, malaise, subjective fever and weakness.  Patient has been also 

complaining of worsening abdominal pain, periumbilical, cramping, 10 out of 10 

in intensity, associated with nausea vomiting.  He has chronic N/V due to 

gastroparesis. Of note, patient was recently admitted to [facility] on 

was discharged on IV antibiotics for unclear etiology.





In the emergency room, initial temperature was 103, heart rate 118, heart 

respiration 28, blood pressure 143/75.  18,000.  Hemoglobin 7.8.  Platelets 

127.  Creatinine 1.9.  Chest x-ray showed bibasilar infiltrates.  Increased 

density measured in the mesentery and pancreas.  It also showed diffuse patchy 

alveolar density. 





Microbiology:





Blood cultures:


11/29 ngtd








Urine cultures:








Current Antimicrobials:


Zosyn





Previous Antimicrobials:





























Past History


Past Medical History: diabetes (30+ years), hypertension, other (diabetic 

gastroparesis)


Past Surgical History: No surgical history


Social history: other (he is an ex-smoker.  Denies alcohol abuse or drug abuse)


Family history: other (patient states that his father also had kidney disease 

but not on dialysis)





Medications and Allergies


 Allergies











Allergy/AdvReac Type Severity Reaction Status Date / Time


 


codeine Allergy  Unknown Verified 01/26/17 12:42











 Home Medications











 Medication  Instructions  Recorded  Confirmed  Last Taken  Type


 


Pregabalin [Lyrica] 75 mg PO QDAY 01/26/17 11/29/17 Unknown History


 


Diazepam Tab [Valium] 5 mg PO QID PRN #30 tablet 03/21/17 11/29/17 Unknown Rx


 


Hydromorphone HCl [Dilaudid] 4 mg PO Q6H #30 tablet 03/21/17 11/29/17 Unknown Rx


 


Insulin Regular, Human [HumuLIN R] See Protocol SUB-Q ACHS #1 vial 03/21/17 11/ 29/17 Unknown Rx


 


Ondansetron [Zofran ODT TAB] 4 mg PO Q8HR #90 tab.rapdis 03/21/17 11/29/17 

Unknown Rx


 


Ciprofloxacin HCl [Ciprofloxacin 500 mg PO Q12H 11/29/17 11/29/17 Unknown 

History





TAB]     


 


Lisinopril [Zestril TAB] 2.5 mg PO QDAY 11/29/17 11/29/17 Unknown History











Active Meds: 


Active Medications





Acetaminophen (Tylenol)  650 mg PO Q4H PRN


   PRN Reason: For Pain/Fever/Headache


Dextrose (D50w (25gm) Syringe)  50 ml IV PRN PRN


   PRN Reason: Hypoglycemia


Diazepam (Valium)  5 mg PO QID PRN


   PRN Reason: Anxiety


Heparin Sodium (Porcine) (Heparin)  5,000 unit SUB-Q Q12HR [facility]


   Last Admin: 11/30/17 10:00 Dose:  Not Given


Hydromorphone HCl (Dilaudid)  1 mg IV Q3H PRN


   PRN Reason: Pain , Severe (7-10)


   Last Admin: 11/30/17 15:08 Dose:  1 mg


Piperacillin Sod/Tazobactam Sod (Zosyn/Ns 3.375gm/50ml)  3.375 gm in 50 mls @ 

100 mls/hr IV Q6H [facility]


   PRN Reason: Protocol


   Last Admin: 11/30/17 15:09 Dose:  100 mls/hr


Amino Acids/Electrolytes/Dextrose (Tpn Adult)  1,800 mls @ 75 mls/hr IV DAILY@

2000 [facility]


   PRN Reason: Protocol


   Stop: 12/01/17 19:59


Insulin Detemir (Levemir)  12 units SUB-Q QHS [facility]


Insulin Human Regular (Novolin R)  0 units SUB-Q ACHS [facility]


   PRN Reason: Protocol


   Last Admin: 11/30/17 12:51 Dose:  4 units


Lisinopril (Zestril)  2.5 mg PO QDAY [facility]


   Last Admin: 11/30/17 09:48 Dose:  2.5 mg


Ondansetron HCl (Zofran)  4 mg IV Q6H PRN


   PRN Reason: Nausea And Vomiting


   Last Admin: 11/29/17 18:17 Dose:  4 mg


Pregabalin (Lyrica)  75 mg PO QDAY [facility]


   Last Admin: 11/30/17 09:48 Dose:  75 mg











Review of Systems


All systems: negative (per HPI. Denies dysuria, heamturia.)





Physical Examination





- Physical Exam


Narrative exam: 


General appearance: Alert in NAD, conversant 


Eyes: anicteric sclerae, moist conjunctivae; no lid-lag; PERRLA 


HENT: Atraumatic; oropharynx clear with moist mucous membranes and no mucosal 

ulcerations/no oral thrush; normal hard and soft palate. Normal external ears.


Neck: Trachea midline; supple, no thyromegaly or lymphadenopathy 


Lungs: CTA, with normal respiratory effort and no intercostal retractions 


CV: RRR, no murmurs


Abdomen: Soft, TTP diffusely + PEG


Extremities: marked kate pedal edema


Skin: Normal temperature, turgor and texture; no rash, ulcers or subcutaneous 

nodules 


Psych: Appropriate affect, alert and oriented to person, place and time. Neuro: 

alert and oriented x 3. Moving all extermities


Lines: No CVL / PICC














- Constitutional


Vitals: 


 Vital Signs











Temp Pulse Resp BP Pulse Ox


 


 97.6 F   84   20   105/63   94 


 


 11/30/17 08:06  11/30/17 08:06  11/30/17 08:06  11/30/17 08:06  11/30/17 08:06








 Temperature -Last 24 Hours











Temperature                    97.6 F


 


Temperature                    97.5 F


 


Temperature                    98.8 F


 


Temperature                    98.3 F


 


Temperature                    98.7 F

















Results





- Labs


CBC & Chem 7: 


 11/30/17 06:12





 11/30/17 06:12


Labs: 


 Abnormal lab results











  11/29/17 11/30/17 11/30/17 Range/Units





  22:49 05:51 06:12 


 


RBC    2.84 L  (3.65-5.03)  M/mm3


 


Hgb    7.8 L  (11.8-15.2)  gm/dl


 


Hct    24.1 L  (35.5-45.6)  %


 


MCH    27 L  (28-32)  pg


 


RDW    19.2 H  (13.2-15.2)  %


 


Plt Count    121 L  (140-440)  K/mm3


 


Carbon Dioxide     (22-30)  mmol/L


 


BUN     (9-20)  mg/dL


 


Creatinine     (0.8-1.5)  mg/dL


 


Glucose     ()  mg/dL


 


POC Glucose  221 H  279 H   ()  


 


Hemoglobin A1c     (4-6)  %


 


Calcium     (8.4-10.2)  mg/dL














  11/30/17 11/30/17 11/30/17 Range/Units





  06:12 06:12 12:09 


 


RBC     (3.65-5.03)  M/mm3


 


Hgb     (11.8-15.2)  gm/dl


 


Hct     (35.5-45.6)  %


 


MCH     (28-32)  pg


 


RDW     (13.2-15.2)  %


 


Plt Count     (140-440)  K/mm3


 


Carbon Dioxide  21 L    (22-30)  mmol/L


 


BUN  34 H    (9-20)  mg/dL


 


Creatinine  1.9 H    (0.8-1.5)  mg/dL


 


Glucose  259 H    ()  mg/dL


 


POC Glucose    276 H  ()  


 


Hemoglobin A1c   8.5 H   (4-6)  %


 


Calcium  7.6 L    (8.4-10.2)  mg/dL














Assessment and Plan


Assessment: 


1) Sepsis: Present on admission, manifested by fever, tachycardia,  

leukocytosis.  Etiology most likely pneumonia.


2) Bilateral pneumonia: HAP versus aspiration


3) DM-poorly controlled


4) ARIANNE


5) Abdominal pain ? pancreatitis +/- gastroparesis 





Plan:


-follow-up blood cultures


-obtian UA and urine culture


-obtain respiratory cultures, procalcitonin, C-reactive protein (CRP)


-continue zosyn renally dosed


-add vanco renaly dosed


-request [facility] discharge summary 





Thank you Dr Negro for your consultation, will follow up with you. 





Mari Gardner MD


Infectious Diseases Specialist


[facility] ([facility])


M [phone]


O [phone]

## 2017-11-30 NOTE — ULTRASOUND REPORT
FINAL REPORT



PROCEDURE:  US RENAL BILAT



TECHNIQUE:  Real-time sonography in multiple planes of the

kidneys, ureters and urinary bladder was performed with image

documentation. CPT 37895







HISTORY:  ARIANNE 



COMPARISON:  No prior studies are available for comparison.



FINDINGS:  

RIGHT kidney: There is a prominent calyx or possible cyst in the

right kidney upper pole, measuring 1.8 x 1.3 x 0.8 centimeters.

Length: 12.4 cm. 



LEFT kidney: Normal echotexture. No focal renal mass, calculus,

or hydronephrosis. Length: 14.5cm.



Bladder: Normal.



Small amount of left upper quadrant fluid is noted 



IMPRESSION:  

No significant hydronephrosis bilaterally.

## 2017-11-30 NOTE — PROGRESS NOTE
<JESSICA JOHNSON - Last Filed: 11/30/17 15:45>





Assessment and Plan


Assessment and plan: 





Assessment:


Patient seen and evaluated today.  Is alert and oriented times 4, no apparent 

distress noted but did complained of intermittent Shortness of Breath.  Patient 

pupils equal and reactive to light and accommodation.  Neck is without jugular 

vein distension and adenopathy.  Lungs auscultated and clear in the upper lobes 

and diminished in the bases.  Noted with normal heart sound, regular with S1 S2

, no murmur or gallop heard.  Patient with + 3 pedal edema in with is tender to 

palpation, + 2 pedal pulses throughout.  Abdomen noted to be soft, non-

distended and tender to palpation, hypoactive bowel sound noted in all 4 

quadrants. Overall skin warm, dry and intact.





Plan:





Pneumonia:


   Continue Current Antibiotic Therapy


   Follow-up with Blood Cultures


   Monitor Labs: CBC





Acute Renal Failure:


   Nephrology Consulted and are following


   Continue to Monitor Renal Function 


    


   


Shortness of Breath:


   D/C IV Fluids D/T SOB and Bilateral Pedal Edema


   Echo- Hx Endocarditis and TVR





Abdominal Pain:


   GI Consult


   Pain management


   


DVT Prophylaxis:


   Heparin 5,000 Units





Nutrition:


   Nutrition Consult for TPN and Malnutrition (Albumin Level 2.7)


   Dietary Supplements


   








Patient has been seen in conjunction with Dr. Negro who agree with treatment 

regimen and current plan of care.








History


Interval history: 


The patient is a 47-year-old  male who presented to the ER with 

chief complaint of abdominal pain.  The patient stated that symptoms started 3 

days in which was constant and sharp in nature.  Patient admits to nausea and 

vomiting and occasional diarrhea.  Patient missed a subjective fever and 

occasional cough is sometimes productive of sputum.  Patient denies dysuria or 

hematuria.  Patient states he was admitted at [facility] 

and discharged 4 days ago on home IV antibiotics.  Stated he is uncertain of 

the type of infection he was being treated for.  Patient have Past Medical 

History of HTN, DM with insulin Pump, Gastroparesis, Endocarditis with 

Tricuspid Valve Repair, Gastrectomy, Repair of malrotation and Cervical Fusion.

  Patient admitted to Med-Surg Unit with Diagnosis of Bilateral Pneumonia, 

Acute Renal Failure, Gastroparesis, and Abdominal Pain.





























Hospitalist Physical





- Constitutional


Vitals: 


 











Temp Pulse Resp BP Pulse Ox


 


 97.6 F   84   20   105/63   94 


 


 11/30/17 08:06  11/30/17 08:06  11/30/17 08:06  11/30/17 08:06  11/30/17 08:06











General appearance: Present: no acute distress





- EENT


Eyes: Present: PERRL


ENT: hearing intact, clear oral mucosa, dentition normal





- Neck


Neck: Present: normal ROM





- Respiratory


Respiratory effort: normal (C/O intermittent SOB)


Respiratory: bilateral: CTA, diminished (in the bases)





- Cardiovascular


Rhythm: regular


Heart Sounds: Present: S1 & S2.  Absent: gallop, systolic murmur, diastolic 

murmur, rub





- Extremities


Extremities: pulses symmetrical


Extremity abnormal: edema (+ 3 to Bilateral lower extremitied), pulses 

diminished





- Abdominal


General gastrointestinal: soft, tender, non-distended, hypoactive bowel sounds





- Integumentary


Integumentary: Present: clear, warm, dry, normal turgor.  Absent: jaundice, rash





- Psychiatric


Psychiatric: appropriate mood/affect





Results





- Labs


CBC & Chem 7: 


 11/30/17 06:12





 11/30/17 06:12


Labs: 


 Laboratory Last Values











WBC  11.0 K/mm3 (4.5-11.0)   11/30/17  06:12    


 


RBC  2.84 M/mm3 (3.65-5.03)  L  11/30/17  06:12    


 


Hgb  7.8 gm/dl (11.8-15.2)  L  11/30/17  06:12    


 


Hct  24.1 % (35.5-45.6)  L  11/30/17  06:12    


 


MCV  85 fl (84-94)   11/30/17  06:12    


 


MCH  27 pg (28-32)  L  11/30/17  06:12    


 


MCHC  32 % (32-34)   11/30/17  06:12    


 


RDW  19.2 % (13.2-15.2)  H  11/30/17  06:12    


 


Plt Count  121 K/mm3 (140-440)  L  11/30/17  06:12    


 


Add Manual Diff  Complete   11/29/17  13:06    


 


Total Counted  100   11/29/17  13:06    


 


Seg Neutrophils %  Np   11/29/17  13:06    


 


Seg Neuts % (Manual)  93.0 % (40.0-70.0)  H  11/29/17  13:06    


 


Band Neutrophils %  3.0 %  11/29/17  13:06    


 


Lymphocytes % (Manual)  4.0 % (13.4-35.0)  L  11/29/17  13:06    


 


Reactive Lymphs % (Man)  0 %  11/29/17  13:06    


 


Monocytes % (Manual)  0 % (0.0-7.3)   11/29/17  13:06    


 


Eosinophils % (Manual)  0 % (0.0-4.3)   11/29/17  13:06    


 


Basophils % (Manual)  0 % (0.0-1.8)   11/29/17  13:06    


 


Metamyelocytes %  0 %  11/29/17  13:06    


 


Myelocytes %  0 %  11/29/17  13:06    


 


Promyelocytes %  0 %  11/29/17  13:06    


 


Blast Cells %  0 %  11/29/17  13:06    


 


Nucleated RBC %  Not Reportable   11/29/17  13:06    


 


Seg Neutrophils # Man  17.5 K/mm3 (1.8-7.7)  H  11/29/17  13:06    


 


Band Neutrophils #  0.6 K/mm3  11/29/17  13:06    


 


Lymphocytes # (Manual)  0.8 K/mm3 (1.2-5.4)  L  11/29/17  13:06    


 


Abs React Lymphs (Man)  0.0 K/mm3  11/29/17  13:06    


 


Monocytes # (Manual)  0.0 K/mm3 (0.0-0.8)   11/29/17  13:06    


 


Eosinophils # (Manual)  0.0 K/mm3 (0.0-0.4)   11/29/17  13:06    


 


Basophils # (Manual)  0.0 K/mm3 (0.0-0.1)   11/29/17  13:06    


 


Metamyelocytes #  0.0 K/mm3  11/29/17  13:06    


 


Myelocytes #  0.0 K/mm3  11/29/17  13:06    


 


Promyelocytes #  0.0 K/mm3  11/29/17  13:06    


 


Blast Cells #  0.0 K/mm3  11/29/17  13:06    


 


WBC Morphology  Not Reportable   11/29/17  13:06    


 


Hypersegmented Neuts  Not Reportable   11/29/17  13:06    


 


Hyposegmented Neuts  Not Reportable   11/29/17  13:06    


 


Hypogranular Neuts  Not Reportable   11/29/17  13:06    


 


Smudge Cells  Not Reportable   11/29/17  13:06    


 


Toxic Granulation  Not Reportable   11/29/17  13:06    


 


Toxic Vacuolation  Not Reportable   11/29/17  13:06    


 


Dohle Bodies  Not Reportable   11/29/17  13:06    


 


Pelger-Huet Anomaly  Not Reportable   11/29/17  13:06    


 


Natalie Rods  Not Reportable   11/29/17  13:06    


 


Platelet Estimate  Appears normal   11/29/17  13:06    


 


Clumped Platelets  Not Reportable   11/29/17  13:06    


 


Plt Clumps, EDTA  Not Reportable   11/29/17  13:06    


 


Large Platelets  Not Reportable   11/29/17  13:06    


 


Giant Platelets  Not Reportable   11/29/17  13:06    


 


Platelet Satelliting  Not Reportable   11/29/17  13:06    


 


Plt Morphology Comment  Not Reportable   11/29/17  13:06    


 


RBC Morphology  Not Reportable   11/29/17  13:06    


 


Dimorphic RBCs  Not Reportable   11/29/17  13:06    


 


Polychromasia  Few   11/29/17  13:06    


 


Hypochromasia  Not Reportable   11/29/17  13:06    


 


Poikilocytosis  1+   11/29/17  13:06    


 


Anisocytosis  1+   11/29/17  13:06    


 


Microcytosis  Not Reportable   11/29/17  13:06    


 


Macrocytosis  Not Reportable   11/29/17  13:06    


 


Spherocytes  Not Reportable   11/29/17  13:06    


 


Pappenheimer Bodies  Not Reportable   11/29/17  13:06    


 


Sickle Cells  Not Reportable   11/29/17  13:06    


 


Target Cells  Not Reportable   11/29/17  13:06    


 


Tear Drop Cells  Few   11/29/17  13:06    


 


Ovalocytes  1+   11/29/17  13:06    


 


Helmet Cells  Rare   11/29/17  13:06    


 


[place] Bodies  Not Reportable   11/29/17  13:06    


 


Cabot Rings  Not Reportable   11/29/17  13:06    


 


[place] Cells  Few   11/29/17  13:06    


 


Bite Cells  Not Reportable   11/29/17  13:06    


 


Crenated Cell  Not Reportable   11/29/17  13:06    


 


Elliptocytes  Few   11/29/17  13:06    


 


Acanthocytes (Spur)  Not Reportable   11/29/17  13:06    


 


Rouleaux  Not Reportable   11/29/17  13:06    


 


Hemoglobin C Crystals  Not Reportable   11/29/17  13:06    


 


Schistocytes  Rare   11/29/17  13:06    


 


Malaria parasites  Not Reportable   11/29/17  13:06    


 


Luis Bodies  Not Reportable   11/29/17  13:06    


 


Hem Pathologist Commnt  No   11/29/17  13:06    


 


PT  17.2 Sec. (12.2-14.9)  H  11/29/17  13:05    


 


INR  1.34  (0.87-1.13)  H  11/29/17  13:05    


 


APTT  34.8 Sec. (24.2-36.6)   11/29/17  13:05    


 


Sodium  138 mmol/L (137-145)   11/30/17  06:12    


 


Potassium  4.9 mmol/L (3.6-5.0)   11/30/17  06:12    


 


Chloride  102.0 mmol/L ()   11/30/17  06:12    


 


Carbon Dioxide  21 mmol/L (22-30)  L  11/30/17  06:12    


 


Anion Gap  20 mmol/L  11/30/17  06:12    


 


BUN  34 mg/dL (9-20)  H  11/30/17  06:12    


 


Creatinine  1.9 mg/dL (0.8-1.5)  H  11/30/17  06:12    


 


Estimated GFR  46 ml/min  11/30/17  06:12    


 


BUN/Creatinine Ratio  18 %  11/30/17  06:12    


 


Glucose  259 mg/dL ()  H  11/30/17  06:12    


 


POC Glucose  276  ()  H  11/30/17  12:09    


 


Hemoglobin A1c  8.5 % (4-6)  H  11/30/17  06:12    


 


Lactic Acid  1.20 mmol/L (0.7-2.0)   11/29/17  15:21    


 


Calcium  7.6 mg/dL (8.4-10.2)  L  11/30/17  06:12    


 


Total Bilirubin  0.80 mg/dL (0.1-1.2)   11/29/17  13:06    


 


AST  25 units/L (5-40)   11/29/17  13:06    


 


ALT  21 units/L (7-56)   11/29/17  13:06    


 


Alkaline Phosphatase  374 units/L ()  H  11/29/17  13:06    


 


Total Creatine Kinase  29 units/L ()  L  11/29/17  13:05    


 


CK-MB (CK-2)  < 1.0 ng/mL (0.0-4.0)   11/29/17  13:05    


 


CK-MB (CK-2) Rel Index  3.4  (0-4)   11/29/17  13:05    


 


Troponin T  0.021 ng/mL (0.00-0.029)   11/29/17  13:05    


 


Total Protein  6.2 g/dL (6.3-8.2)  L  11/29/17  13:06    


 


Albumin  2.7 g/dL (3.9-5)  L  11/29/17  13:06    


 


Albumin/Globulin Ratio  0.8 %  11/29/17  13:06    


 


Lipase  4 units/L (13-60)  L  11/29/17  13:05    


 


Random Vancomycin  10.1 ug/mL (0-40.0)   11/29/17  13:05    














<ISA NEGRO - Last Filed: 12/01/17 07:14>





Assessment and Plan


Assessment and plan: 





I saw and evaluated the patient. I agree with the findings and the plan of care 

as documented in the Nurse Practitioner's~note, with the following corrections 

and additions.


Sepsis:


continue abx while awaiting records. 


moderate to severe protein calorie malnutrition- Nutritiont consult placed. 

Request for GI is to evaluate if patient can be placed back on Tube feeds 

rather than TPN


Check Echo considering B/L lower ext Edema which patient states is new


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 Renal 

consult,


Thrombocytopenia- Monitor closely, no overt bleeding noted. 


Malfunctioning Vascular access- Consult placed to Vascular. 





Hospitalist Physical





- Constitutional


Vitals: 


 











Temp Pulse Resp BP Pulse Ox


 


 97.2 F L  80   18   111/69   97 


 


 11/30/17 15:06  11/30/17 15:06  12/01/17 05:20  11/30/17 15:06  11/30/17 15:06














Results





- Labs


CBC & Chem 7: 


 11/30/17 06:12





 12/01/17 05:41


Labs: 


 Laboratory Last Values











WBC  11.0 K/mm3 (4.5-11.0)   11/30/17  06:12    


 


RBC  2.84 M/mm3 (3.65-5.03)  L  11/30/17  06:12    


 


Hgb  7.8 gm/dl (11.8-15.2)  L  11/30/17  06:12    


 


Hct  24.1 % (35.5-45.6)  L  11/30/17  06:12    


 


MCV  85 fl (84-94)   11/30/17  06:12    


 


MCH  27 pg (28-32)  L  11/30/17  06:12    


 


MCHC  32 % (32-34)   11/30/17  06:12    


 


RDW  19.2 % (13.2-15.2)  H  11/30/17  06:12    


 


Plt Count  121 K/mm3 (140-440)  L  11/30/17  06:12    


 


Add Manual Diff  Complete   11/29/17  13:06    


 


Total Counted  100   11/29/17  13:06    


 


Seg Neutrophils %  Np   11/29/17  13:06    


 


Seg Neuts % (Manual)  93.0 % (40.0-70.0)  H  11/29/17  13:06    


 


Band Neutrophils %  3.0 %  11/29/17  13:06    


 


Lymphocytes % (Manual)  4.0 % (13.4-35.0)  L  11/29/17  13:06    


 


Reactive Lymphs % (Man)  0 %  11/29/17  13:06    


 


Monocytes % (Manual)  0 % (0.0-7.3)   11/29/17  13:06    


 


Eosinophils % (Manual)  0 % (0.0-4.3)   11/29/17  13:06    


 


Basophils % (Manual)  0 % (0.0-1.8)   11/29/17  13:06    


 


Metamyelocytes %  0 %  11/29/17  13:06    


 


Myelocytes %  0 %  11/29/17  13:06    


 


Promyelocytes %  0 %  11/29/17  13:06    


 


Blast Cells %  0 %  11/29/17  13:06    


 


Nucleated RBC %  Not Reportable   11/29/17  13:06    


 


Seg Neutrophils # Man  17.5 K/mm3 (1.8-7.7)  H  11/29/17  13:06    


 


Band Neutrophils #  0.6 K/mm3  11/29/17  13:06    


 


Lymphocytes # (Manual)  0.8 K/mm3 (1.2-5.4)  L  11/29/17  13:06    


 


Abs React Lymphs (Man)  0.0 K/mm3  11/29/17  13:06    


 


Monocytes # (Manual)  0.0 K/mm3 (0.0-0.8)   11/29/17  13:06    


 


Eosinophils # (Manual)  0.0 K/mm3 (0.0-0.4)   11/29/17  13:06    


 


Basophils # (Manual)  0.0 K/mm3 (0.0-0.1)   11/29/17  13:06    


 


Metamyelocytes #  0.0 K/mm3  11/29/17  13:06    


 


Myelocytes #  0.0 K/mm3  11/29/17  13:06    


 


Promyelocytes #  0.0 K/mm3  11/29/17  13:06    


 


Blast Cells #  0.0 K/mm3  11/29/17  13:06    


 


WBC Morphology  Not Reportable   11/29/17  13:06    


 


Hypersegmented Neuts  Not Reportable   11/29/17  13:06    


 


Hyposegmented Neuts  Not Reportable   11/29/17  13:06    


 


Hypogranular Neuts  Not Reportable   11/29/17  13:06    


 


Smudge Cells  Not Reportable   11/29/17  13:06    


 


Toxic Granulation  Not Reportable   11/29/17  13:06    


 


Toxic Vacuolation  Not Reportable   11/29/17  13:06    


 


Dohle Bodies  Not Reportable   11/29/17  13:06    


 


Pelger-Huet Anomaly  Not Reportable   11/29/17  13:06    


 


Natalie Rods  Not Reportable   11/29/17  13:06    


 


Platelet Estimate  Appears normal   11/29/17  13:06    


 


Clumped Platelets  Not Reportable   11/29/17  13:06    


 


Plt Clumps, EDTA  Not Reportable   11/29/17  13:06    


 


Large Platelets  Not Reportable   11/29/17  13:06    


 


Giant Platelets  Not Reportable   11/29/17  13:06    


 


Platelet Satelliting  Not Reportable   11/29/17  13:06    


 


Plt Morphology Comment  Not Reportable   11/29/17  13:06    


 


RBC Morphology  Not Reportable   11/29/17  13:06    


 


Dimorphic RBCs  Not Reportable   11/29/17  13:06    


 


Polychromasia  Few   11/29/17  13:06    


 


Hypochromasia  Not Reportable   11/29/17  13:06    


 


Poikilocytosis  1+   11/29/17  13:06    


 


Anisocytosis  1+   11/29/17  13:06    


 


Microcytosis  Not Reportable   11/29/17  13:06    


 


Macrocytosis  Not Reportable   11/29/17  13:06    


 


Spherocytes  Not Reportable   11/29/17  13:06    


 


Pappenheimer Bodies  Not Reportable   11/29/17  13:06    


 


Sickle Cells  Not Reportable   11/29/17  13:06    


 


Target Cells  Not Reportable   11/29/17  13:06    


 


Tear Drop Cells  Few   11/29/17  13:06    


 


Ovalocytes  1+   11/29/17  13:06    


 


Helmet Cells  Rare   11/29/17  13:06    


 


[place] Bodies  Not Reportable   11/29/17  13:06    


 


Cabot Rings  Not Reportable   11/29/17  13:06    


 


[place] Cells  Few   11/29/17  13:06    


 


Bite Cells  Not Reportable   11/29/17  13:06    


 


Crenated Cell  Not Reportable   11/29/17  13:06    


 


Elliptocytes  Few   11/29/17  13:06    


 


Acanthocytes (Spur)  Not Reportable   11/29/17  13:06    


 


Rouleaux  Not Reportable   11/29/17  13:06    


 


Hemoglobin C Crystals  Not Reportable   11/29/17  13:06    


 


Schistocytes  Rare   11/29/17  13:06    


 


Malaria parasites  Not Reportable   11/29/17  13:06    


 


Luis Bodies  Not Reportable   11/29/17  13:06    


 


Hem Pathologist Commnt  No   11/29/17  13:06    


 


PT  17.2 Sec. (12.2-14.9)  H  11/29/17  13:05    


 


INR  1.34  (0.87-1.13)  H  11/29/17  13:05    


 


APTT  34.8 Sec. (24.2-36.6)   11/29/17  13:05    


 


Sodium  132 mmol/L (137-145)  L  12/01/17  05:41    


 


Potassium  4.4 mmol/L (3.6-5.0)   12/01/17  05:41    


 


Chloride  99.6 mmol/L ()   12/01/17  05:41    


 


Carbon Dioxide  21 mmol/L (22-30)  L  12/01/17  05:41    


 


Anion Gap  16 mmol/L  12/01/17  05:41    


 


BUN  28 mg/dL (9-20)  H  12/01/17  05:41    


 


Creatinine  1.7 mg/dL (0.8-1.5)  H  12/01/17  05:41    


 


Estimated GFR  53 ml/min  12/01/17  05:41    


 


BUN/Creatinine Ratio  16 %  12/01/17  05:41    


 


Glucose  219 mg/dL ()  H  12/01/17  05:41    


 


POC Glucose  250  ()  H  12/01/17  06:38    


 


Hemoglobin A1c  8.5 % (4-6)  H  11/30/17  06:12    


 


Lactic Acid  1.20 mmol/L (0.7-2.0)   11/29/17  15:21    


 


Calcium  8.0 mg/dL (8.4-10.2)  L  12/01/17  05:41    


 


Phosphorus  3.10 mg/dL (2.5-4.5)   12/01/17  05:41    


 


Magnesium  1.70 mg/dL (1.7-2.3)   12/01/17  05:41    


 


Total Bilirubin  0.40 mg/dL (0.1-1.2)   12/01/17  05:41    


 


AST  14 units/L (5-40)   12/01/17  05:41    


 


ALT  17 units/L (7-56)   12/01/17  05:41    


 


Alkaline Phosphatase  284 units/L ()  H  12/01/17  05:41    


 


Total Creatine Kinase  29 units/L ()  L  11/29/17  13:05    


 


CK-MB (CK-2)  < 1.0 ng/mL (0.0-4.0)   11/29/17  13:05    


 


CK-MB (CK-2) Rel Index  3.4  (0-4)   11/29/17  13:05    


 


Troponin T  0.021 ng/mL (0.00-0.029)   11/29/17  13:05    


 


C-Reactive Protein  8.70 mg/dL (0.00-1.30)  H  11/30/17  15:39    


 


Total Protein  6.1 g/dL (6.3-8.2)  L  12/01/17  05:41    


 


Albumin  2.5 g/dL (3.9-5)  L  12/01/17  05:41    


 


Albumin/Globulin Ratio  0.7 %  12/01/17  05:41    


 


Lipase  4 units/L (13-60)  L  11/29/17  13:05    


 


Urine Color  Yellow  (Yellow)   12/01/17  05:55    


 


Urine Turbidity  Clear  (Clear)   12/01/17  05:55    


 


Urine pH  5.0  (5.0-7.0)   12/01/17  05:55    


 


Ur Specific Gravity  1.019  (1.003-1.030)   12/01/17  05:55    


 


Urine Protein  <15 mg/dl mg/dL (Negative)   12/01/17  05:55    


 


Urine Glucose (UA)  50 mg/dL (Negative)   12/01/17  05:55    


 


Urine Ketones  Neg mg/dL (Negative)   12/01/17  05:55    


 


Urine Blood  Neg  (Negative)   12/01/17  05:55    


 


Urine Nitrite  Neg  (Negative)   12/01/17  05:55    


 


Urine Bilirubin  Neg  (Negative)   12/01/17  05:55    


 


Urine Urobilinogen  < 2.0 mg/dL (<2.0)   12/01/17  05:55    


 


Ur Leukocyte Esterase  Sm  (Negative)   12/01/17  05:55    


 


Urine WBC (Auto)  27.0 /HPF (0.0-6.0)  H  12/01/17  05:55    


 


Urine RBC (Auto)  4.0 /HPF (0.0-6.0)   12/01/17  05:55    


 


U Epithel Cells (Auto)  < 1.0 /HPF (0-13.0)   12/01/17  05:55    


 


Urine Mucus  Few /HPF  12/01/17  05:55    


 


Urine Creatinine  136.3 mg/dL (0.1-20.0)  H  12/01/17  05:55    


 


Urine Sodium  32 mmol/L  12/01/17  05:55    


 


Fraction Sodium Excret  0.2   12/01/17  05:55    


 


Random Vancomycin  10.1 ug/mL (0-40.0)   11/29/17  13:05

## 2017-11-30 NOTE — CONSULTATION
History of Present Illness





- Reason for Consult


acute renal failure


Requesting physician: EVELINA PEREZ





- History of Present Illness


The patient is a 47-year-old male presenting with a chief complaint of 

abdominal pain.  The patient states for the past 3 days he is periumbilical 

abdominal pain as being constant and sharp in nature.  Patient admits to nausea 

and vomiting and occasional diarrhea.   Patient denies dysuria or hematuria.  

Patient states he was admitted at [facility] and discharged 

4 days ago on home IV antibiotics.  Patient states he is uncertain of the type 

of infection he was being treated for.


Days that he has had knowledge of renal dysfunction in the past.  Denies any 

significant nonsteroidal use.  No history of gross hematuria or kidney stones.


Serum creatinine noted to be 1.9 on admission and therefore this consultation.  

Review of records showed that he had a serum creatinine of 1.1 in March 2017.  

It appears that he had acute kidney injury at that time as well











Past History


Past Medical History: diabetes (30+ years), hypertension, other (diabetic 

gastroparesis)


Past Surgical History: No surgical history


Social history: other (he is an ex-smoker.  Denies alcohol abuse or drug abuse)


Family history: other (patient states that his father also had kidney disease 

but not on dialysis)





Medications and Allergies


 Allergies











Allergy/AdvReac Type Severity Reaction Status Date / Time


 


codeine Allergy  Unknown Verified 01/26/17 12:42











 Home Medications











 Medication  Instructions  Recorded  Confirmed  Last Taken  Type


 


Pregabalin [Lyrica] 75 mg PO QDAY 01/26/17 11/29/17 Unknown History


 


Diazepam Tab [Valium] 5 mg PO QID PRN #30 tablet 03/21/17 11/29/17 Unknown Rx


 


Hydromorphone HCl [Dilaudid] 4 mg PO Q6H #30 tablet 03/21/17 11/29/17 Unknown Rx


 


Insulin Regular, Human [HumuLIN R] See Protocol SUB-Q ACHS #1 vial 03/21/17 11/ 29/17 Unknown Rx


 


Ondansetron [Zofran ODT TAB] 4 mg PO Q8HR #90 tab.rapdis 03/21/17 11/29/17 

Unknown Rx


 


Ciprofloxacin HCl [Ciprofloxacin 500 mg PO Q12H 11/29/17 11/29/17 Unknown 

History





TAB]     


 


Lisinopril [Zestril TAB] 2.5 mg PO QDAY 11/29/17 11/29/17 Unknown History











Active Meds: 


Active Medications





Acetaminophen (Tylenol)  650 mg PO Q4H PRN


   PRN Reason: For Pain/Fever/Headache


Dextrose (D50w (25gm) Syringe)  50 ml IV PRN PRN


   PRN Reason: Hypoglycemia


Diazepam (Valium)  5 mg PO QID PRN


   PRN Reason: Anxiety


Heparin Sodium (Porcine) (Heparin)  5,000 unit SUB-Q Q12HR [facility]


   Last Admin: 11/29/17 22:45 Dose:  Not Given


Hydromorphone HCl (Dilaudid)  1 mg IV Q3H PRN


   PRN Reason: Pain , Severe (7-10)


   Last Admin: 11/30/17 08:07 Dose:  1 mg


Sodium Chloride (Nacl 0.9% 1000 Ml)  1,000 mls @ 150 mls/hr IV AS DIRECT [facility]


   Last Admin: 11/30/17 09:47 Dose:  150 mls/hr


Piperacillin Sod/Tazobactam Sod (Zosyn/Ns 3.375gm/50ml)  3.375 gm in 50 mls @ 

100 mls/hr IV Q6H [facility]


   PRN Reason: Protocol


   Last Admin: 11/30/17 05:16 Dose:  100 mls/hr


Insulin Detemir (Levemir)  12 units SUB-Q QHS [facility]


Insulin Human Regular (Novolin R)  0 units SUB-Q ACHS [facility]


   PRN Reason: Protocol


Lisinopril (Zestril)  2.5 mg PO QDAY [facility]


   Last Admin: 11/30/17 09:48 Dose:  2.5 mg


Ondansetron HCl (Zofran)  4 mg IV Q6H PRN


   PRN Reason: Nausea And Vomiting


   Last Admin: 11/29/17 18:17 Dose:  4 mg


Pregabalin (Lyrica)  75 mg PO QDAY [facility]


   Last Admin: 11/30/17 09:48 Dose:  75 mg











Review of Systems


All systems: negative (negative except as noted above)





Exam





- Vital Signs


Vital signs: 


 Vital Signs











Pulse Ox


 


 98 


 


 11/29/17 12:02














- General Appearance


General appearance: well-developed, well-nourished, appears stated age


EENT: PERRL, mucous membranes moist


Neck: Present: neck supple, trachea midline.  Absent: JVD/HJR, Masses


Respiratory: Clear to Ascultation


Heart: regular, normal heart rate, S1S2, no murmurs


Gastrointestinal: Present: normal, normoactive bowel sounds


Integumentary: no rash, other (1+ edema)





Results





- Lab Results





 11/30/17 06:12





 11/30/17 06:12


 Most recent lab results











Calcium  7.6 mg/dL (8.4-10.2)  L  11/30/17  06:12    














Assessment and Plan


Impression


* Acute renal failure.  Most likely prerenal


* Diabetic gastroparesis


* Hypertension


* Peripheral edema


* Anemia


Recommendations


* Shall check a UA as well as a fractional excretion of sodium


* Renal ultrasound to assess kidney size and echogenicity


* If the patient's urine sediment is active and/or if his renal function does 

not improve, he will need additional workup


* Agree with IV hydration


* Avoid nephrotoxins


* Monitor fluid status and electrolytes closely


* Thank you very much for the consultation.  Shall follow along with you

## 2017-12-01 LAB
ALBUMIN SERPL-MCNC: 2.5 G/DL (ref 3.9–5)
ALBUMIN/GLOB SERPL: 0.7 %
ALP SERPL-CCNC: 284 UNITS/L (ref 35–129)
ALT SERPL-CCNC: 17 UNITS/L (ref 7–56)
ANION GAP SERPL CALC-SCNC: 16 MMOL/L
BILIRUB SERPL-MCNC: 0.4 MG/DL (ref 0.1–1.2)
BILIRUB UR QL STRIP: (no result)
BLOOD UR QL VISUAL: (no result)
BUN SERPL-MCNC: 28 MG/DL (ref 9–20)
BUN/CREAT SERPL: 16 %
CALCIUM SERPL-MCNC: 8 MG/DL (ref 8.4–10.2)
CHLORIDE SERPL-SCNC: 99.6 MMOL/L (ref 98–107)
CO2 SERPL-SCNC: 21 MMOL/L (ref 22–30)
FRACT EXCRET NA UR+SERPL-RTO: 0.2 %
GLUCOSE SERPL-MCNC: 219 MG/DL (ref 75–100)
HIV-1 ANTIGEN P24: (no result)
HIVR-1/2 AB: (no result)
KETONES UR STRIP-MCNC: (no result) MG/DL
LEUKOCYTE ESTERASE UR QL STRIP: (no result)
MAGNESIUM SERPL-MCNC: 1.7 MG/DL (ref 1.7–2.3)
MUCOUS THREADS #/AREA URNS HPF: (no result) /HPF
NITRITE UR QL STRIP: (no result)
PH UR STRIP: 5 [PH] (ref 5–7)
PHOSPHATE SERPL-MCNC: 3.1 MG/DL (ref 2.5–4.5)
POTASSIUM SERPL-SCNC: 4.4 MMOL/L (ref 3.6–5)
PROT SERPL-MCNC: 6.1 G/DL (ref 6.3–8.2)
PROT UR STRIP-MCNC: (no result) MG/DL
RBC #/AREA URNS HPF: 4 /HPF (ref 0–6)
SODIUM SERPL-SCNC: 132 MMOL/L (ref 137–145)
SODIUM UR-SCNC: 32 MMOL/L
UROBILINOGEN UR-MCNC: < 2 MG/DL (ref ?–2)
WBC #/AREA URNS HPF: 27 /HPF (ref 0–6)

## 2017-12-01 PROCEDURE — 0JPT3XZ REMOVAL OF TUNNELED VASCULAR ACCESS DEVICE FROM TRUNK SUBCUTANEOUS TISSUE AND FASCIA, PERCUTANEOUS APPROACH: ICD-10-PCS | Performed by: RADIOLOGY

## 2017-12-01 PROCEDURE — B2141ZZ FLUOROSCOPY OF RIGHT HEART USING LOW OSMOLAR CONTRAST: ICD-10-PCS | Performed by: RADIOLOGY

## 2017-12-01 PROCEDURE — 0JH63XZ INSERTION OF TUNNELED VASCULAR ACCESS DEVICE INTO CHEST SUBCUTANEOUS TISSUE AND FASCIA, PERCUTANEOUS APPROACH: ICD-10-PCS | Performed by: RADIOLOGY

## 2017-12-01 PROCEDURE — 02PA33Z REMOVAL OF INFUSION DEVICE FROM HEART, PERCUTANEOUS APPROACH: ICD-10-PCS | Performed by: RADIOLOGY

## 2017-12-01 PROCEDURE — 02H633Z INSERTION OF INFUSION DEVICE INTO RIGHT ATRIUM, PERCUTANEOUS APPROACH: ICD-10-PCS | Performed by: RADIOLOGY

## 2017-12-01 RX ADMIN — PIPERACILLIN SODIUM AND TAZOBACTAM SODIUM SCH MLS/HR: 3; .375 INJECTION, POWDER, LYOPHILIZED, FOR SOLUTION INTRAVENOUS at 12:10

## 2017-12-01 RX ADMIN — HEPARIN SODIUM SCH: 5000 INJECTION, SOLUTION INTRAVENOUS; SUBCUTANEOUS at 10:37

## 2017-12-01 RX ADMIN — ALBUMIN (HUMAN) SCH GM: 0.25 INJECTION, SOLUTION INTRAVENOUS at 17:30

## 2017-12-01 RX ADMIN — FENTANYL CITRATE ONE MCG: 50 INJECTION, SOLUTION INTRAMUSCULAR; INTRAVENOUS at 09:00

## 2017-12-01 RX ADMIN — LIDOCAINE HYDROCHLORIDE ONE ML: 20 INJECTION, SOLUTION INFILTRATION; PERINEURAL at 09:02

## 2017-12-01 RX ADMIN — HYDROMORPHONE HYDROCHLORIDE SCH MG: 2 TABLET ORAL at 20:34

## 2017-12-01 RX ADMIN — HEPARIN SODIUM SCH: 5000 INJECTION, SOLUTION INTRAVENOUS; SUBCUTANEOUS at 22:13

## 2017-12-01 RX ADMIN — LISINOPRIL SCH MG: 5 TABLET ORAL at 10:25

## 2017-12-01 RX ADMIN — PIPERACILLIN SODIUM AND TAZOBACTAM SODIUM SCH MLS/HR: 3; .375 INJECTION, POWDER, LYOPHILIZED, FOR SOLUTION INTRAVENOUS at 05:09

## 2017-12-01 RX ADMIN — HYDROMORPHONE HYDROCHLORIDE PRN MG: 2 INJECTION, SOLUTION INTRAMUSCULAR; INTRAVENOUS; SUBCUTANEOUS at 22:54

## 2017-12-01 RX ADMIN — FUROSEMIDE SCH MG: 10 INJECTION, SOLUTION INTRAVENOUS at 17:51

## 2017-12-01 RX ADMIN — MIDAZOLAM ONE MG: 1 INJECTION INTRAMUSCULAR; INTRAVENOUS at 08:59

## 2017-12-01 RX ADMIN — PIPERACILLIN SODIUM AND TAZOBACTAM SODIUM SCH MLS/HR: 3; .375 INJECTION, POWDER, LYOPHILIZED, FOR SOLUTION INTRAVENOUS at 17:50

## 2017-12-01 RX ADMIN — FENTANYL CITRATE ONE MCG: 50 INJECTION, SOLUTION INTRAMUSCULAR; INTRAVENOUS at 08:57

## 2017-12-01 RX ADMIN — HYDROMORPHONE HYDROCHLORIDE PRN MG: 2 INJECTION, SOLUTION INTRAMUSCULAR; INTRAVENOUS; SUBCUTANEOUS at 04:50

## 2017-12-01 RX ADMIN — PIPERACILLIN SODIUM AND TAZOBACTAM SODIUM SCH MLS/HR: 3; .375 INJECTION, POWDER, LYOPHILIZED, FOR SOLUTION INTRAVENOUS at 23:46

## 2017-12-01 RX ADMIN — INSULIN DETEMIR SCH UNITS: 100 INJECTION, SOLUTION SUBCUTANEOUS at 22:12

## 2017-12-01 RX ADMIN — HYDROMORPHONE HYDROCHLORIDE PRN MG: 2 INJECTION, SOLUTION INTRAMUSCULAR; INTRAVENOUS; SUBCUTANEOUS at 10:36

## 2017-12-01 RX ADMIN — VANCOMYCIN HYDROCHLORIDE SCH MLS/HR: 1 INJECTION, POWDER, LYOPHILIZED, FOR SOLUTION INTRAVENOUS at 17:50

## 2017-12-01 RX ADMIN — HYDROMORPHONE HYDROCHLORIDE SCH MG: 2 TABLET ORAL at 14:17

## 2017-12-01 RX ADMIN — MIDAZOLAM ONE MG: 1 INJECTION INTRAMUSCULAR; INTRAVENOUS at 08:56

## 2017-12-01 RX ADMIN — ONDANSETRON PRN MG: 2 INJECTION INTRAMUSCULAR; INTRAVENOUS at 04:51

## 2017-12-01 RX ADMIN — PREGABALIN SCH MG: 75 CAPSULE ORAL at 10:25

## 2017-12-01 RX ADMIN — LIDOCAINE HYDROCHLORIDE ONE ML: 20 INJECTION, SOLUTION INFILTRATION; PERINEURAL at 08:57

## 2017-12-01 RX ADMIN — VANCOMYCIN HYDROCHLORIDE SCH MLS/HR: 1 INJECTION, POWDER, LYOPHILIZED, FOR SOLUTION INTRAVENOUS at 05:09

## 2017-12-01 NOTE — PROGRESS NOTE
Assessment and Plan


Assessment: 


1) Sepsis: better.  Etiology most likely pneumonia +/- UTI. CRP=8.7


2) Bilateral pneumonia: HAP versus aspiration


3) DM-poorly controlled


4) ARIANNE


5) Abdominal pain ? pancreatitis +/- gastroparesis 


6) UTI





Plan:


-follow-up blood cultures, urine culture, procalcitonin


-continue zosyn and vanco renaly dosed


-request [facility] discharge summary 





i am covering tomorrow





Thank you Dr Negro for your consultation, will follow up with you. 





Mari Gardner MD


Infectious Diseases Specialist


[facility] ([facility])


M [phone]


O [phone]








Subjective


Date of service: 12/01/17


Principal diagnosis: abd pain





Objective





- Constitutional


Vitals: 


 Vital Signs











Temp Pulse Resp BP Pulse Ox


 


 98.5 F   87   18   101/61   95 


 


 12/01/17 09:40  12/01/17 09:40  12/01/17 09:40  12/01/17 09:40  12/01/17 09:40








 Temperature -Last 24 Hours











Temperature                    98.5 F


 


Temperature                    97.7 F

















- Labs


CBC & Chem 7: 


 11/30/17 06:12





 12/01/17 05:41


Labs: 


 Abnormal lab results











  11/30/17 11/30/17 11/30/17 Range/Units





  15:39 16:56 21:28 


 


Sodium     (137-145)  mmol/L


 


Carbon Dioxide     (22-30)  mmol/L


 


BUN     (9-20)  mg/dL


 


Creatinine     (0.8-1.5)  mg/dL


 


Glucose     ()  mg/dL


 


POC Glucose   312 H  233 H  ()  


 


Calcium     (8.4-10.2)  mg/dL


 


Alkaline Phosphatase     ()  units/L


 


C-Reactive Protein  8.70 H    (0.00-1.30)  mg/dL


 


Total Protein     (6.3-8.2)  g/dL


 


Albumin     (3.9-5)  g/dL


 


Urine WBC (Auto)     (0.0-6.0)  /HPF


 


Urine Creatinine     (0.1-20.0)  mg/dL














  12/01/17 12/01/17 12/01/17 Range/Units





  05:41 05:55 05:55 


 


Sodium  132 L    (137-145)  mmol/L


 


Carbon Dioxide  21 L    (22-30)  mmol/L


 


BUN  28 H    (9-20)  mg/dL


 


Creatinine  1.7 H    (0.8-1.5)  mg/dL


 


Glucose  219 H    ()  mg/dL


 


POC Glucose     ()  


 


Calcium  8.0 L    (8.4-10.2)  mg/dL


 


Alkaline Phosphatase  284 H    ()  units/L


 


C-Reactive Protein     (0.00-1.30)  mg/dL


 


Total Protein  6.1 L    (6.3-8.2)  g/dL


 


Albumin  2.5 L    (3.9-5)  g/dL


 


Urine WBC (Auto)   27.0 H   (0.0-6.0)  /HPF


 


Urine Creatinine    136.3 H  (0.1-20.0)  mg/dL














  12/01/17 12/01/17 Range/Units





  06:38 12:46 


 


Sodium    (137-145)  mmol/L


 


Carbon Dioxide    (22-30)  mmol/L


 


BUN    (9-20)  mg/dL


 


Creatinine    (0.8-1.5)  mg/dL


 


Glucose    ()  mg/dL


 


POC Glucose  250 H  252 H  ()  


 


Calcium    (8.4-10.2)  mg/dL


 


Alkaline Phosphatase    ()  units/L


 


C-Reactive Protein    (0.00-1.30)  mg/dL


 


Total Protein    (6.3-8.2)  g/dL


 


Albumin    (3.9-5)  g/dL


 


Urine WBC (Auto)    (0.0-6.0)  /HPF


 


Urine Creatinine    (0.1-20.0)  mg/dL

## 2017-12-01 NOTE — PROGRESS NOTE
Assessment and Plan


Impression


* Acute renal failure.  


* Diabetic gastroparesis


* Hypertension


* Peripheral edema


* Anemia


* Hypoalbuminemia


Recommendations


* His urine does show some pyuria but no significant proteinuria.  


* His urine fractional excretion of sodium is only 0.2%.  However clinically he 

does not appear to be dry.  His ejection fraction noted to be 55-60% and he 

does not have any pericardial effusion 


* Etiology of his edema remains unclear.  Serum albumin however noted to be 

quite low at 2.5.  Patient may be third spacing due to that.  He however does 

not have any significant dipstick proteinuria as noted above.  Shall check an 

HIV test as well as a hepatitis panel


*  Renal ultrasound is essentially normal 


* Shall check urine for eosinophils as well


* Showed vasculitis workup as well, as etiology of his renal dysfunction 

remains unclear


* Shall add IV albumin with Lasix


* Avoid nephrotoxins


* Monitor fluid status and electrolytes closely











Subjective


Date of service: 12/01/17


Interval history: 


Patient is awake and alert.  His nausea is improving .  Has mild Shortness of 

breath.  His legs are still swollen








Objective





- Vital Signs


Vital signs: 


 Vital Signs - 12hr











  11/30/17 12/01/17 12/01/17





  23:08 04:50 05:20


 


Temperature 97.7 F  


 


Pulse Rate 82  


 


Respiratory 18 18 18





Rate   


 


Blood Pressure 98/56  


 


O2 Sat by Pulse 96  





Oximetry   














  12/01/17





  09:40


 


Temperature 98.5 F


 


Pulse Rate 87


 


Respiratory 18





Rate 


 


Blood Pressure 101/61


 


O2 Sat by Pulse 95





Oximetry 














- General Appearance


General appearance: well-developed, well-nourished, appears stated age


EENT: PERRL, mucous membranes moist


Neck: no JVD, no thyromegaly, no carotid bruit, supple


Respiratory: Present: Decreased Breath Sounds (at the bases)


Cardiology: regular, normal heart rate


Gastrointestinal: normal, normoactive bowel sounds


Integumentary: other (2+ pitting edema)





- Lab





 11/30/17 06:12





 12/01/17 05:41


 Most recent lab results











Calcium  8.0 mg/dL (8.4-10.2)  L  12/01/17  05:41    


 


Phosphorus  3.10 mg/dL (2.5-4.5)   12/01/17  05:41    


 


Magnesium  1.70 mg/dL (1.7-2.3)   12/01/17  05:41    


 


Urine Creatinine  136.3 mg/dL (0.1-20.0)  H  12/01/17  05:55    


 


Urine Sodium  32 mmol/L  12/01/17  05:55

## 2017-12-01 NOTE — PROGRESS NOTE
Assessment and Plan


Assessment and plan: 


Patient is a 48 yo aam with h/o poorly controlled diabetes, recurrent abdominal 

p ain, and gastroparesis, chronic pancreatitis, and chronically on TPN for years

, who presents with worsening abdominal pain, n/v, and diffuse body aches. He 

reports having chronic abdominal pain for over a decade with associated n/v.  

He has symptoms at baseline, but has periods of worsening symptoms.  Of note, 

patient recently was at [facility] for DKA and sepsis (2/2 tpn line induced bacteremia)

.  Previously plans for gastroparesis included TPN, feeding tube (which he has)

, dm management, and to f/u with specialist at [place].  He reports abd pain 

being diffuse, worsened with meals. Patient was recently admitted to [facility] on was discharged on IV antibiotics for unclear etiology. Chest x-ray 

showed bibasilar infiltrates.  Increased density measured in the mesentery and 

pancreas.  It also showed diffuse patchy alveolar density. 








Sepsis- Possible secondary to GNR 


Chronic Peritoneal irriatation secondary to chronic gastroparesis; with 

persistent Nausea and vomiting


Moderate to severe protein calorie malnutrition


Diabetes Mellitus with hyperglycemia


ARIANNE on ckd secondary to vasomotor nephropathy


Acute Cystisis-Complex


Hypotension- Monitor closely


Bilateral Lobar Pneumonia possible GNR


Chronic Pancreatitis


Chronic Pain syndrome


Thrombocytopenia


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





Plan:


* Cultures with no growth in 48 hrs, will monitor for additional 24hrs


* Continue iv abx, zosyn and vanco


* GI input noted


* Resume TPN


* will adjust insulin if no improvement with TPN


* Echo shows EF 55-60%, PER Nephrology, will give Albumin with lasix


* Resume chronic pain meds, on Dilaudid- although have advised this patient 

that this could be a great contributor to his gastroparesis. 


* He states he follows at [place]


* Appreciate Vascular for access re-establishment


* dvt/gi PROPHY


* Plan of care discussed with patient and consultants























History


Interval history: 


Patient is examined in no acute distress at this time was improved generally.  

He is also scheduled for pain medication which she states that he chronically 

takes.








Hospitalist Physical





- Physical exam


Narrative exam: 


  VITAL SIGNS:  Reviewed.    


GENERAL:  The patient appeared in no acute distress sitting up comfortably. 

Vital signs as documented.


HEAD:  No signs of head trauma.


EYES:  Pupils are equal.  Extraocular motions intact.  


EARS:  Hearing grossly intact.


MOUTH:  Oropharynx is normal. 


NECK:  No adenopathy, no JVD.   


CHEST:  Chest with clear breath sounds bilaterally.  No wheezes, rales, or 

rhonchi.  


CARDIAC:  Regular rate and rhythm.  S1 and S2, without murmurs, gallops, or 

rubs.


VASCULAR: 1+ edema bilaterally.  Peripheral pulses normal and equal in all 

extremities.


ABDOMEN:  Soft, PEG tube in place multiple surgical scars noted mild tenderness 

with no rebound or guarding mildly distended but patient chronic.  Due to 

distorted anatomy Not accurately mention on masses.  Hypoactive bowel sounds


MUSCULOSKELETAL:  Right chest wall tunneled catheter Good range of motion of 

all major joints. Extremities without clubbing, cyanosis .  1+ pitting edema 

bilaterally.  


NEUROLOGIC EXAM:  Alert and oriented x 3.  No focal sensory or strength 

deficits.   Speech normal.  Follows commands.


PSYCHIATRIC:  Mood normal.


SKIN:  No rash or lesions.








- Constitutional


Vitals: 


 











Temp Pulse Resp BP Pulse Ox


 


 98.5 F   87   18   101/61   95 


 


 12/01/17 09:40  12/01/17 09:40  12/01/17 09:40  12/01/17 09:40  12/01/17 09:40











General appearance: Present: no acute distress





Results





- Labs


CBC & Chem 7: 


 11/30/17 06:12





 12/01/17 05:41


Labs: 


 Laboratory Last Values











WBC  11.0 K/mm3 (4.5-11.0)   11/30/17  06:12    


 


RBC  2.84 M/mm3 (3.65-5.03)  L  11/30/17  06:12    


 


Hgb  7.8 gm/dl (11.8-15.2)  L  11/30/17  06:12    


 


Hct  24.1 % (35.5-45.6)  L  11/30/17  06:12    


 


MCV  85 fl (84-94)   11/30/17  06:12    


 


MCH  27 pg (28-32)  L  11/30/17  06:12    


 


MCHC  32 % (32-34)   11/30/17  06:12    


 


RDW  19.2 % (13.2-15.2)  H  11/30/17  06:12    


 


Plt Count  121 K/mm3 (140-440)  L  11/30/17  06:12    


 


Add Manual Diff  Complete   11/29/17  13:06    


 


Total Counted  100   11/29/17  13:06    


 


Seg Neutrophils %  Np   11/29/17  13:06    


 


Seg Neuts % (Manual)  93.0 % (40.0-70.0)  H  11/29/17  13:06    


 


Band Neutrophils %  3.0 %  11/29/17  13:06    


 


Lymphocytes % (Manual)  4.0 % (13.4-35.0)  L  11/29/17  13:06    


 


Reactive Lymphs % (Man)  0 %  11/29/17  13:06    


 


Monocytes % (Manual)  0 % (0.0-7.3)   11/29/17  13:06    


 


Eosinophils % (Manual)  0 % (0.0-4.3)   11/29/17  13:06    


 


Basophils % (Manual)  0 % (0.0-1.8)   11/29/17  13:06    


 


Metamyelocytes %  0 %  11/29/17  13:06    


 


Myelocytes %  0 %  11/29/17  13:06    


 


Promyelocytes %  0 %  11/29/17  13:06    


 


Blast Cells %  0 %  11/29/17  13:06    


 


Nucleated RBC %  Not Reportable   11/29/17  13:06    


 


Seg Neutrophils # Man  17.5 K/mm3 (1.8-7.7)  H  11/29/17  13:06    


 


Band Neutrophils #  0.6 K/mm3  11/29/17  13:06    


 


Lymphocytes # (Manual)  0.8 K/mm3 (1.2-5.4)  L  11/29/17  13:06    


 


Abs React Lymphs (Man)  0.0 K/mm3  11/29/17  13:06    


 


Monocytes # (Manual)  0.0 K/mm3 (0.0-0.8)   11/29/17  13:06    


 


Eosinophils # (Manual)  0.0 K/mm3 (0.0-0.4)   11/29/17  13:06    


 


Basophils # (Manual)  0.0 K/mm3 (0.0-0.1)   11/29/17  13:06    


 


Metamyelocytes #  0.0 K/mm3  11/29/17  13:06    


 


Myelocytes #  0.0 K/mm3  11/29/17  13:06    


 


Promyelocytes #  0.0 K/mm3  11/29/17  13:06    


 


Blast Cells #  0.0 K/mm3  11/29/17  13:06    


 


WBC Morphology  Not Reportable   11/29/17  13:06    


 


Hypersegmented Neuts  Not Reportable   11/29/17  13:06    


 


Hyposegmented Neuts  Not Reportable   11/29/17  13:06    


 


Hypogranular Neuts  Not Reportable   11/29/17  13:06    


 


Smudge Cells  Not Reportable   11/29/17  13:06    


 


Toxic Granulation  Not Reportable   11/29/17  13:06    


 


Toxic Vacuolation  Not Reportable   11/29/17  13:06    


 


Dohle Bodies  Not Reportable   11/29/17  13:06    


 


Pelger-Huet Anomaly  Not Reportable   11/29/17  13:06    


 


Natalie Rods  Not Reportable   11/29/17  13:06    


 


Platelet Estimate  Appears normal   11/29/17  13:06    


 


Clumped Platelets  Not Reportable   11/29/17  13:06    


 


Plt Clumps, EDTA  Not Reportable   11/29/17  13:06    


 


Large Platelets  Not Reportable   11/29/17  13:06    


 


Giant Platelets  Not Reportable   11/29/17  13:06    


 


Platelet Satelliting  Not Reportable   11/29/17  13:06    


 


Plt Morphology Comment  Not Reportable   11/29/17  13:06    


 


RBC Morphology  Not Reportable   11/29/17  13:06    


 


Dimorphic RBCs  Not Reportable   11/29/17  13:06    


 


Polychromasia  Few   11/29/17  13:06    


 


Hypochromasia  Not Reportable   11/29/17  13:06    


 


Poikilocytosis  1+   11/29/17  13:06    


 


Anisocytosis  1+   11/29/17  13:06    


 


Microcytosis  Not Reportable   11/29/17  13:06    


 


Macrocytosis  Not Reportable   11/29/17  13:06    


 


Spherocytes  Not Reportable   11/29/17  13:06    


 


Pappenheimer Bodies  Not Reportable   11/29/17  13:06    


 


Sickle Cells  Not Reportable   11/29/17  13:06    


 


Target Cells  Not Reportable   11/29/17  13:06    


 


Tear Drop Cells  Few   11/29/17  13:06    


 


Ovalocytes  1+   11/29/17  13:06    


 


Helmet Cells  Rare   11/29/17  13:06    


 


Varela-Schoeneck Bodies  Not Reportable   11/29/17  13:06    


 


Cabot Rings  Not Reportable   11/29/17  13:06    


 


Tammy Cells  Few   11/29/17  13:06    


 


Bite Cells  Not Reportable   11/29/17  13:06    


 


Crenated Cell  Not Reportable   11/29/17  13:06    


 


Elliptocytes  Few   11/29/17  13:06    


 


Acanthocytes (Spur)  Not Reportable   11/29/17  13:06    


 


Rouleaux  Not Reportable   11/29/17  13:06    


 


Hemoglobin C Crystals  Not Reportable   11/29/17  13:06    


 


Schistocytes  Rare   11/29/17  13:06    


 


Malaria parasites  Not Reportable   11/29/17  13:06    


 


Luis Bodies  Not Reportable   11/29/17  13:06    


 


Hem Pathologist Commnt  No   11/29/17  13:06    


 


PT  17.2 Sec. (12.2-14.9)  H  11/29/17  13:05    


 


INR  1.34  (0.87-1.13)  H  11/29/17  13:05    


 


APTT  34.8 Sec. (24.2-36.6)   11/29/17  13:05    


 


Sodium  132 mmol/L (137-145)  L  12/01/17  05:41    


 


Potassium  4.4 mmol/L (3.6-5.0)   12/01/17  05:41    


 


Chloride  99.6 mmol/L ()   12/01/17  05:41    


 


Carbon Dioxide  21 mmol/L (22-30)  L  12/01/17  05:41    


 


Anion Gap  16 mmol/L  12/01/17  05:41    


 


BUN  28 mg/dL (9-20)  H  12/01/17  05:41    


 


Creatinine  1.7 mg/dL (0.8-1.5)  H  12/01/17  05:41    


 


Estimated GFR  53 ml/min  12/01/17  05:41    


 


BUN/Creatinine Ratio  16 %  12/01/17  05:41    


 


Glucose  219 mg/dL ()  H  12/01/17  05:41    


 


POC Glucose  252  ()  H  12/01/17  12:46    


 


Hemoglobin A1c  8.5 % (4-6)  H  11/30/17  06:12    


 


Lactic Acid  1.20 mmol/L (0.7-2.0)   11/29/17  15:21    


 


Calcium  8.0 mg/dL (8.4-10.2)  L  12/01/17  05:41    


 


Phosphorus  3.10 mg/dL (2.5-4.5)   12/01/17  05:41    


 


Magnesium  1.70 mg/dL (1.7-2.3)   12/01/17  05:41    


 


Total Bilirubin  0.40 mg/dL (0.1-1.2)   12/01/17  05:41    


 


AST  14 units/L (5-40)   12/01/17  05:41    


 


ALT  17 units/L (7-56)   12/01/17  05:41    


 


Alkaline Phosphatase  284 units/L ()  H  12/01/17  05:41    


 


Total Creatine Kinase  29 units/L ()  L  11/29/17  13:05    


 


CK-MB (CK-2)  < 1.0 ng/mL (0.0-4.0)   11/29/17  13:05    


 


CK-MB (CK-2) Rel Index  3.4  (0-4)   11/29/17  13:05    


 


Troponin T  0.021 ng/mL (0.00-0.029)   11/29/17  13:05    


 


C-Reactive Protein  8.70 mg/dL (0.00-1.30)  H  11/30/17  15:39    


 


Total Protein  6.1 g/dL (6.3-8.2)  L  12/01/17  05:41    


 


Albumin  2.5 g/dL (3.9-5)  L  12/01/17  05:41    


 


Albumin/Globulin Ratio  0.7 %  12/01/17  05:41    


 


Lipase  4 units/L (13-60)  L  11/29/17  13:05    


 


Urine Color  Yellow  (Yellow)   12/01/17  05:55    


 


Urine Turbidity  Clear  (Clear)   12/01/17  05:55    


 


Urine pH  5.0  (5.0-7.0)   12/01/17  05:55    


 


Ur Specific Gravity  1.019  (1.003-1.030)   12/01/17  05:55    


 


Urine Protein  <15 mg/dl mg/dL (Negative)   12/01/17  05:55    


 


Urine Glucose (UA)  50 mg/dL (Negative)   12/01/17  05:55    


 


Urine Ketones  Neg mg/dL (Negative)   12/01/17  05:55    


 


Urine Blood  Neg  (Negative)   12/01/17  05:55    


 


Urine Nitrite  Neg  (Negative)   12/01/17  05:55    


 


Urine Bilirubin  Neg  (Negative)   12/01/17  05:55    


 


Urine Urobilinogen  < 2.0 mg/dL (<2.0)   12/01/17  05:55    


 


Ur Leukocyte Esterase  Sm  (Negative)   12/01/17  05:55    


 


Urine WBC (Auto)  27.0 /HPF (0.0-6.0)  H  12/01/17  05:55    


 


Urine RBC (Auto)  4.0 /HPF (0.0-6.0)   12/01/17  05:55    


 


U Epithel Cells (Auto)  < 1.0 /HPF (0-13.0)   12/01/17  05:55    


 


Urine Mucus  Few /HPF  12/01/17  05:55    


 


Urine Eosinophils  None seen  (None Seen)   12/01/17  05:55    


 


Urine Creatinine  136.3 mg/dL (0.1-20.0)  H  12/01/17  05:55    


 


Urine Sodium  32 mmol/L  12/01/17  05:55    


 


Fraction Sodium Excret  0.2   12/01/17  05:55    


 


Random Vancomycin  10.1 ug/mL (0-40.0)   11/29/17  13:05    














- Imaging and Cardiology


Chest x-ray: image reviewed (BILATERAL OPACITIS)

## 2017-12-01 NOTE — QUERY- PNEUMONIA DOCUMENTED
Dillon Loredo___Marky______________   Date:__12/1/2017__________   



/CDS:___July___________   Phone#:__[phone]________



Exercise your independent professional judgment when responding to query.  

Questions asked do not imply a particular answer is desired or expected. We 
greatly appreciate your clarification on this issue.           

Clinical Documentation States:



47 Year old male was admitted on 11/29/2017 for SOB.



The Hospitalist (Dr. Negro) progress note on 11/30/2017 states



"Pneumonia:

   Continue Current Antibiotic Therapy."     

  

Clinical Findings Show:  



Antibiotics: IV Zosyn and Vancomycin





Please further specify known or suspected Etiology:

[ ] Aspiration Pneumonia                                                       
                                        

[ ] Gram Negative Pneumonia  

[x ] Gram Positive Pneumonia

[ ] Pseudomonas Pneumonia

[ ] MRSA - related Pneumonia

[ ] Viral Pneumonia

[ ] Candidal Pneumonia                         

[ ] Other:_________________

[ ] Unable to determine



Present on Admission:    [y ] Yes (Y)    [ ] Clinically undeterminable (W)     
[ ] No (N)    



Please also document response in your Progress Notes and/or Discharge Summary 
and 

indicate if the condition was present on admission.

CORNELIA

## 2017-12-01 NOTE — GASTROENTEROLOGY PROGRESS NOTE
<BEATRIZ GUERRA - Last Filed: 12/01/17 11:55>





Assessment and Plan


1.abdominal pain


 2.N/V





-pt with hx of chronic abd pain with N/V likely due to known diagnosis of 

gastroparesis-followed by specialist at [facility]


-TPN and G tube


-CT scan-no acute process, lipase normal, and LFTs mostly unremarkable


-clinically pt reports improvement in N/V today and is requesting to eat and 

drink


-will start on clear liquids- nutrition consult pending regarding po intake vs 

tube feed recommendations


-diabetes management per primary team


-avoid narcotics


-continue supportive care- consider trial of low dose marinol vs ativan (short 

term)


-will follow














Subjective


Date of service: 12/01/17


Principal diagnosis: abd pain


Interval history: 


Patient sitting on the side of the bed. No acute distress. Reports improvement 

in N/V and is requesting to be able to eat and drink.








Objective





- Constitutional


Vitals: 


 











Temp Pulse Resp BP Pulse Ox


 


 98.5 F   87   18   101/61   95 


 


 12/01/17 09:40  12/01/17 09:40  12/01/17 09:40  12/01/17 09:40  12/01/17 09:40











General appearance: no acute distress





- Respiratory


Respiratory: bilateral: CTA





- Cardiovascular


Rhythm: regular


Heart Sounds: Present: S1 & S2





- Gastrointestinal


General gastrointestinal: Present: soft, tender (diffuse TTP, no r/g), 

distended (mild), hypoactive bowel sounds, other (+ surgical scars, +g tube 

site c/d/i)





- Integumentary


Integumentary: Present: warm, dry





- Neurologic


Neurological: alert and oriented x3





- Labs


CBC & Chem 7: 


 11/30/17 06:12





 12/01/17 05:41


Labs: 


 Laboratory Results - last 24 hr











  11/30/17 11/30/17 11/30/17





  06:15 12:09 15:39


 


Sodium   


 


Potassium   


 


Chloride   


 


Carbon Dioxide   


 


Anion Gap   


 


BUN   


 


Creatinine   


 


Estimated GFR   


 


BUN/Creatinine Ratio   


 


Glucose   


 


POC Glucose   276 H 


 


Calcium   


 


Phosphorus  3.50  


 


Magnesium  1.80  


 


Total Bilirubin   


 


AST   


 


ALT   


 


Alkaline Phosphatase   


 


C-Reactive Protein    8.70 H


 


Total Protein   


 


Albumin   


 


Albumin/Globulin Ratio   


 


Urine Color   


 


Urine Turbidity   


 


Urine pH   


 


Ur Specific Gravity   


 


Urine Protein   


 


Urine Glucose (UA)   


 


Urine Ketones   


 


Urine Blood   


 


Urine Nitrite   


 


Urine Bilirubin   


 


Urine Urobilinogen   


 


Ur Leukocyte Esterase   


 


Urine WBC (Auto)   


 


Urine RBC (Auto)   


 


U Epithel Cells (Auto)   


 


Urine Mucus   


 


Urine Eosinophils   


 


Urine Creatinine   


 


Urine Sodium   


 


Fraction Sodium Excret   














  11/30/17 11/30/17 12/01/17





  16:56 21:28 05:41


 


Sodium    132 L


 


Potassium    4.4


 


Chloride    99.6


 


Carbon Dioxide    21 L


 


Anion Gap    16


 


BUN    28 H


 


Creatinine    1.7 H


 


Estimated GFR    53


 


BUN/Creatinine Ratio    16


 


Glucose    219 H


 


POC Glucose  312 H  233 H 


 


Calcium    8.0 L


 


Phosphorus    3.10


 


Magnesium    1.70


 


Total Bilirubin    0.40


 


AST    14


 


ALT    17


 


Alkaline Phosphatase    284 H


 


C-Reactive Protein   


 


Total Protein    6.1 L


 


Albumin    2.5 L


 


Albumin/Globulin Ratio    0.7


 


Urine Color   


 


Urine Turbidity   


 


Urine pH   


 


Ur Specific Gravity   


 


Urine Protein   


 


Urine Glucose (UA)   


 


Urine Ketones   


 


Urine Blood   


 


Urine Nitrite   


 


Urine Bilirubin   


 


Urine Urobilinogen   


 


Ur Leukocyte Esterase   


 


Urine WBC (Auto)   


 


Urine RBC (Auto)   


 


U Epithel Cells (Auto)   


 


Urine Mucus   


 


Urine Eosinophils   


 


Urine Creatinine   


 


Urine Sodium   


 


Fraction Sodium Excret   














  12/01/17 12/01/17 12/01/17





  05:55 05:55 05:55


 


Sodium   


 


Potassium   


 


Chloride   


 


Carbon Dioxide   


 


Anion Gap   


 


BUN   


 


Creatinine   


 


Estimated GFR   


 


BUN/Creatinine Ratio   


 


Glucose   


 


POC Glucose   


 


Calcium   


 


Phosphorus   


 


Magnesium   


 


Total Bilirubin   


 


AST   


 


ALT   


 


Alkaline Phosphatase   


 


C-Reactive Protein   


 


Total Protein   


 


Albumin   


 


Albumin/Globulin Ratio   


 


Urine Color  Yellow  


 


Urine Turbidity  Clear  


 


Urine pH  5.0  


 


Ur Specific Gravity  1.019  


 


Urine Protein  <15 mg/dl  


 


Urine Glucose (UA)  50  


 


Urine Ketones  Neg  


 


Urine Blood  Neg  


 


Urine Nitrite  Neg  


 


Urine Bilirubin  Neg  


 


Urine Urobilinogen  < 2.0  


 


Ur Leukocyte Esterase  Sm  


 


Urine WBC (Auto)  27.0 H  


 


Urine RBC (Auto)  4.0  


 


U Epithel Cells (Auto)  < 1.0  


 


Urine Mucus  Few  


 


Urine Eosinophils    None seen


 


Urine Creatinine   136.3 H 


 


Urine Sodium   32 


 


Fraction Sodium Excret   0.2 














  12/01/17





  06:38


 


Sodium 


 


Potassium 


 


Chloride 


 


Carbon Dioxide 


 


Anion Gap 


 


BUN 


 


Creatinine 


 


Estimated GFR 


 


BUN/Creatinine Ratio 


 


Glucose 


 


POC Glucose  250 H


 


Calcium 


 


Phosphorus 


 


Magnesium 


 


Total Bilirubin 


 


AST 


 


ALT 


 


Alkaline Phosphatase 


 


C-Reactive Protein 


 


Total Protein 


 


Albumin 


 


Albumin/Globulin Ratio 


 


Urine Color 


 


Urine Turbidity 


 


Urine pH 


 


Ur Specific Gravity 


 


Urine Protein 


 


Urine Glucose (UA) 


 


Urine Ketones 


 


Urine Blood 


 


Urine Nitrite 


 


Urine Bilirubin 


 


Urine Urobilinogen 


 


Ur Leukocyte Esterase 


 


Urine WBC (Auto) 


 


Urine RBC (Auto) 


 


U Epithel Cells (Auto) 


 


Urine Mucus 


 


Urine Eosinophils 


 


Urine Creatinine 


 


Urine Sodium 


 


Fraction Sodium Excret 














<JACKSON CRAVEN - Last Filed: 12/01/17 16:47>





Assessment and Plan


Patient seen and examined.  Agree with note by Beatriz Guerra.  Tolerated clears 

today.  Overall symptoms mostly unchanged (he has chronic abdominal pain and n/

v for over a decade).  Likely combination of gastroparesis and pain related to 

surgical scars/adhesions.  Currently being treated for PNA which may have 

exacerbated symptoms as well.  Would continue supportive care as above.  Follow-

up with specialist at [facility] he has previously seen. 








Objective





- Constitutional


Vitals: 


 











Temp Pulse Resp BP Pulse Ox


 


 98.0 F   74   24   107/67   92 


 


 12/01/17 16:07  12/01/17 16:07  12/01/17 16:07  12/01/17 16:07  12/01/17 16:07














- Labs


CBC & Chem 7: 


 11/30/17 06:12





 12/01/17 05:41


Labs: 


 Laboratory Results - last 24 hr











  11/30/17 11/30/17 11/30/17





  15:39 16:56 21:28


 


Sodium   


 


Potassium   


 


Chloride   


 


Carbon Dioxide   


 


Anion Gap   


 


BUN   


 


Creatinine   


 


Estimated GFR   


 


BUN/Creatinine Ratio   


 


Glucose   


 


POC Glucose   312 H  233 H


 


Calcium   


 


Phosphorus   


 


Magnesium   


 


Total Bilirubin   


 


AST   


 


ALT   


 


Alkaline Phosphatase   


 


C-Reactive Protein  8.70 H  


 


Total Protein   


 


Albumin   


 


Albumin/Globulin Ratio   


 


Urine Color   


 


Urine Turbidity   


 


Urine pH   


 


Ur Specific Gravity   


 


Urine Protein   


 


Urine Glucose (UA)   


 


Urine Ketones   


 


Urine Blood   


 


Urine Nitrite   


 


Urine Bilirubin   


 


Urine Urobilinogen   


 


Ur Leukocyte Esterase   


 


Urine WBC (Auto)   


 


Urine RBC (Auto)   


 


U Epithel Cells (Auto)   


 


Urine Mucus   


 


Urine Eosinophils   


 


Urine Creatinine   


 


Urine Sodium   


 


Fraction Sodium Excret   














  12/01/17 12/01/17 12/01/17





  05:41 05:55 05:55


 


Sodium  132 L  


 


Potassium  4.4  


 


Chloride  99.6  


 


Carbon Dioxide  21 L  


 


Anion Gap  16  


 


BUN  28 H  


 


Creatinine  1.7 H  


 


Estimated GFR  53  


 


BUN/Creatinine Ratio  16  


 


Glucose  219 H  


 


POC Glucose   


 


Calcium  8.0 L  


 


Phosphorus  3.10  


 


Magnesium  1.70  


 


Total Bilirubin  0.40  


 


AST  14  


 


ALT  17  


 


Alkaline Phosphatase  284 H  


 


C-Reactive Protein   


 


Total Protein  6.1 L  


 


Albumin  2.5 L  


 


Albumin/Globulin Ratio  0.7  


 


Urine Color   Yellow 


 


Urine Turbidity   Clear 


 


Urine pH   5.0 


 


Ur Specific Gravity   1.019 


 


Urine Protein   <15 mg/dl 


 


Urine Glucose (UA)   50 


 


Urine Ketones   Neg 


 


Urine Blood   Neg 


 


Urine Nitrite   Neg 


 


Urine Bilirubin   Neg 


 


Urine Urobilinogen   < 2.0 


 


Ur Leukocyte Esterase   Sm 


 


Urine WBC (Auto)   27.0 H 


 


Urine RBC (Auto)   4.0 


 


U Epithel Cells (Auto)   < 1.0 


 


Urine Mucus   Few 


 


Urine Eosinophils   


 


Urine Creatinine    136.3 H


 


Urine Sodium    32


 


Fraction Sodium Excret    0.2














  12/01/17 12/01/17 12/01/17





  05:55 06:38 12:46


 


Sodium   


 


Potassium   


 


Chloride   


 


Carbon Dioxide   


 


Anion Gap   


 


BUN   


 


Creatinine   


 


Estimated GFR   


 


BUN/Creatinine Ratio   


 


Glucose   


 


POC Glucose   250 H  252 H


 


Calcium   


 


Phosphorus   


 


Magnesium   


 


Total Bilirubin   


 


AST   


 


ALT   


 


Alkaline Phosphatase   


 


C-Reactive Protein   


 


Total Protein   


 


Albumin   


 


Albumin/Globulin Ratio   


 


Urine Color   


 


Urine Turbidity   


 


Urine pH   


 


Ur Specific Gravity   


 


Urine Protein   


 


Urine Glucose (UA)   


 


Urine Ketones   


 


Urine Blood   


 


Urine Nitrite   


 


Urine Bilirubin   


 


Urine Urobilinogen   


 


Ur Leukocyte Esterase   


 


Urine WBC (Auto)   


 


Urine RBC (Auto)   


 


U Epithel Cells (Auto)   


 


Urine Mucus   


 


Urine Eosinophils  None seen  


 


Urine Creatinine   


 


Urine Sodium   


 


Fraction Sodium Excret

## 2017-12-01 NOTE — QUERY- RENAL FAILURE
Dear ______________  Date:___12/1/2017___________ 



/CDS:___July__________  Phone#:__[phone]_________



Exercise your independent professional judgment when responding to query. 

Questions asked do not imply a particular answer is desired or expected. We 
greatly appreciate your clarification on this issue.           

Clinical Documentation States:



47 Year old male was admitted on 11/29/2017 for SOB.



The Hospitalist (Dr. Negro) progress note states



"Acute Renal Failure:

   Nephrology Consulted and are following."



Clinical Findings Show:

 

Creatinine: 1.9





Please clarify if you mean:

Acute Renal Failure with or due to: 

[ ] Tubular Necrosis                     [ ] Medullary Necrosis

[ x] Vasomotor Nephropathy          [ ] Shock Kidney

[ ] Tubular Nephrosis                   [ ] Renal Tubular Stasis 

[ ] Cortical Necrosis                    [ ] Acute Renal Failure (unspecified)

[ ] Lower Tubular Nephrosis           

[ ] Other:___________________     

[ ] Not Applicable





Present on Admission:  [y ] Yes (Y)    [ ] Clinically undeterminable (W)    [ ] 
No (N)   



Please also document response in your Progress Notes and/or Discharge Summary 
and indicate if the 

condition was present on admission.
MTDD

## 2017-12-01 NOTE — OPERATIVE REPORT
Operative Report


Operative Report: 





EXAM: FLUOROSCOPIC GUIDED EXCHANGE OF TUNNELED CENTRAL VENOUS CATHETER





CLINICAL INDICATION: PATIENT WITH A FRACTURED TUNNELED CENTRAL VENOUS CATHETER





DATE: 12/1/2017





PROCEDURE: Following an expiration of the risks, benefits and alternatives; 

written informed was obtained.  The patient was brought to the genetic suite 

and placed in supine position on the examination table.  Initial evaluation of 

the catheter demonstrated that one of the ports had been ripped off.  The 

patient's right shoulder and indwelling catheter were prepped and draped in 

usual sterile fashion.  1% lidocaine was used for anesthesia at the catheter 

exit site and along the tunnel track.





Under fluoroscopy, IV aching guidewire was advanced through the patent report.  

The guidewire was advanced to the IVC to document intravenous positioning.  The 

catheter cuff was dissected freezing accommodation of sharp and blunt 

dissection.  The catheter was then removed intact.  Fluoroscopic images were 

obtained which demonstrated no retained fragments.  A new Bard dual-lumen 

tunneled under venous catheter was then placed over the guidewire and under 

fluoroscopy advanced to position the tip in the proximal right atrium.  The 

guidewire was removed.  Both ports flushed and aspirated easily and more than 

locked with Saline.  The catheter was securely approximated to the chest wall 

using 3-0 Vicryl suture.  Sterile dressing was then applied.





The patient tolerated the procedure well.  There were no immediate post 

procedure consultations.





Conscious sedation was performed under the guidance of radiologic nursing.  

Continuous cardiopulmonary monitoring was utilized.





IMPRESSION: 1] Fluoroscopic guided exchange of tunneled central venous catheter.

## 2017-12-01 NOTE — QUERY- NUTRITION
Dear _Marky_______________   Date:___12/1/2017______________    



/CDS:___July____________  Phone#:___[phone]____________



Exercise your independent professional judgment when responding to query.

Questions asked do not imply a particular answer is desired or expected. We 
greatly appreciate your clarification on this issue.           

Clinical Documentation States:



47 Year old male was admitted on 11/29/2017 for SOB.



The Hospitalist (Dr. Negro) progress note on 11/30/2017 states



"Nutrition:

   Nutrition Consult for TPN and Malnutrition (Albumin Level 2.7)

   Dietary Supplements."

     

   

Clinical Findings Show:



Albumin: 2.7



Please select the most appropriate option

 3



[] Mild Malnutrition

[x] Mild - Moderate Malnutrition [] Moderate - Severe Malnutrition

[] Severe Malnutrition

 

   Serum Albumin 2.8 to 3.4 g/dl or 

    Pre-albumin 5 to 17 mg/dl1,2

   Inadequate nutritional intake1,2,3,4

   NPO > 5 days

   Weight loss: 5% in 1 month or 7.5%

    in 3 months or 10% in 6     

    months1, 3,4

   BMI 16 to 18.4 or Weight <90% of ideal 

    body weight1,2,3,4   Serum Albumin < 2.8 g/ dl1,2

  Lymphocytes < 1500/  L2

  Inadequate nutritional intake3, high stress 

   e.g. major trauma, sepsis,pancreatitis, burns etc.

  Decubitus ulcers1,2, , skin breakdown2, 

   easy hair pluckability2

  Weight <80% standard for height2

  Triceps skin fold <3 mm2

  Mid-arm muscle circumference <15 cm2

  Creatinine-height index <60% standard2

 

[ ] Cachexia

[ ] Emaciated w/Malnutrition

[ ] Other:____________

[ ] Unable to determine

[  ] Comment/Explanation:____________ 







Present on Admission: [ y] Yes (Y)   [ ] Clinically undeterminable (W)   [ ] No 
(N)   



Please also document response in your Progress Notes and/or Discharge Summary 
and 

indicate if the condition was present on admission.
MTDD

## 2017-12-02 VITALS — DIASTOLIC BLOOD PRESSURE: 71 MMHG | SYSTOLIC BLOOD PRESSURE: 100 MMHG

## 2017-12-02 LAB
ANION GAP SERPL CALC-SCNC: 14 MMOL/L
BUN SERPL-MCNC: 24 MG/DL (ref 9–20)
BUN/CREAT SERPL: 17 %
CALCIUM SERPL-MCNC: 8.2 MG/DL (ref 8.4–10.2)
CHLORIDE SERPL-SCNC: 103 MMOL/L (ref 98–107)
CO2 SERPL-SCNC: 25 MMOL/L (ref 22–30)
GLUCOSE SERPL-MCNC: 52 MG/DL (ref 75–100)
MAGNESIUM SERPL-MCNC: 1.5 MG/DL (ref 1.7–2.3)
PHOSPHATE SERPL-MCNC: 3.1 MG/DL (ref 2.5–4.5)
POTASSIUM SERPL-SCNC: 4.1 MMOL/L (ref 3.6–5)
SODIUM SERPL-SCNC: 138 MMOL/L (ref 137–145)

## 2017-12-02 RX ADMIN — HYDROMORPHONE HYDROCHLORIDE PRN MG: 2 INJECTION, SOLUTION INTRAMUSCULAR; INTRAVENOUS; SUBCUTANEOUS at 11:32

## 2017-12-02 RX ADMIN — PIPERACILLIN SODIUM AND TAZOBACTAM SODIUM SCH MLS/HR: 3; .375 INJECTION, POWDER, LYOPHILIZED, FOR SOLUTION INTRAVENOUS at 06:10

## 2017-12-02 RX ADMIN — LISINOPRIL SCH: 5 TABLET ORAL at 10:50

## 2017-12-02 RX ADMIN — HYDROMORPHONE HYDROCHLORIDE SCH MG: 2 TABLET ORAL at 01:56

## 2017-12-02 RX ADMIN — VANCOMYCIN HYDROCHLORIDE SCH MLS/HR: 1 INJECTION, POWDER, LYOPHILIZED, FOR SOLUTION INTRAVENOUS at 06:13

## 2017-12-02 RX ADMIN — HYDROMORPHONE HYDROCHLORIDE PRN MG: 2 INJECTION, SOLUTION INTRAMUSCULAR; INTRAVENOUS; SUBCUTANEOUS at 04:02

## 2017-12-02 RX ADMIN — HYDROMORPHONE HYDROCHLORIDE SCH MG: 2 TABLET ORAL at 08:47

## 2017-12-02 RX ADMIN — PREGABALIN SCH MG: 75 CAPSULE ORAL at 10:48

## 2017-12-02 RX ADMIN — ALBUMIN (HUMAN) SCH: 0.25 INJECTION, SOLUTION INTRAVENOUS at 03:37

## 2017-12-02 RX ADMIN — FUROSEMIDE SCH MG: 10 INJECTION, SOLUTION INTRAVENOUS at 06:10

## 2017-12-02 RX ADMIN — HEPARIN SODIUM SCH: 5000 INJECTION, SOLUTION INTRAVENOUS; SUBCUTANEOUS at 10:50

## 2017-12-02 NOTE — PROGRESS NOTE
Assessment and Plan


Assessment: 


1) Sepsis: better.  Etiology most likely pneumonia +/- UTI. CRP=8.7


2) Bilateral pneumonia: HAP versus aspiration


3) DM-poorly controlled A1C=8.5


4) ARIANNE


5) Abdominal pain ? pancreatitis +/- gastroparesis - better


6) UTI


7) Recent polymicrobial bacteremia from infected To cath - removed and 

treated with IV abx by [facility] 





Plan:


-continue zosyn 


-stop vanco


-upon discharge will do levaquin total 7 days 


 


I am signing off 





Thank you Dr Negro for your consultation, will follow up with you. 





Mari Gardner MD


Infectious Diseases Specialist


[facility] ([facility]


M [phone]


O [phone]








Subjective


Date of service: 12/02/17


Principal diagnosis: Anemia, Diarrhea


Interval history: 


Feels better, no cough, no SOB, no fever. 





Microbiology:





Blood cultures:


11/29 ngtd





Urine cultures:


 





Current Antimicrobials:


Zosyn 11/29


Vancomycin 11/29


 





Previous Antimicrobials:














Objective





- Exam


Narrative Exam: 


General appearance: Alert in NAD, conversant 


Eyes: anicteric sclerae, moist conjunctivae; no lid-lag; PERRLA 


HENT: Atraumatic; oropharynx clear


Neck: Trachea midline; supple, no thyromegaly or lymphadenopathy 


Lungs: CTA, with normal respiratory effort and no intercostal retractions 


CV: RRR, no murmurs


Abdomen: Soft, TTP diffusely + PEG


Extremities: marked kate pedal edema


Skin: Normal temperature, turgor and texture; no rash, ulcers or subcutaneous 

nodules 


Psych: Appropriate affect, alert and oriented to person, place and time. Neuro: 

alert and oriented x 3. Moving all extermities


Lines: No CVL / PICC














- Constitutional


Vitals: 


 Vital Signs











Temp Pulse Resp BP Pulse Ox


 


 97.8 F   88   18   100/71   97 


 


 12/02/17 08:16  12/02/17 08:16  12/02/17 08:16  12/02/17 08:16  12/02/17 08:16








 Temperature -Last 24 Hours











Temperature                    97.8 F


 


Temperature                    98.4 F


 


Temperature                    32.1 F


 


Temperature                    98.2 F


 


Temperature                    98.0 F


 


Temperature                    98.1 F


 


Temperature                    98.0 F

















- Labs


CBC & Chem 7: 


 11/30/17 06:12





 12/02/17 04:17


Labs: 


 Abnormal lab results











  12/01/17 12/01/17 12/01/17 Range/Units





  15:30 17:33 22:11 


 


BUN     (9-20)  mg/dL


 


Glucose     ()  mg/dL


 


POC Glucose   146 H  254 H  ()  


 


Calcium     (8.4-10.2)  mg/dL


 


Magnesium     (1.7-2.3)  mg/dL


 


Urine Creatinine  125.9 H    (0.1-20.0)  mg/dL


 


Urine Total Protein  30 H    (5-11.8)  mg/dL














  12/02/17 12/02/17 12/02/17 Range/Units





  04:17 05:37 12:05 


 


BUN  24 H    (9-20)  mg/dL


 


Glucose  52 L    ()  mg/dL


 


POC Glucose   58 L  63 L  ()  


 


Calcium  8.2 L    (8.4-10.2)  mg/dL


 


Magnesium  1.50 L    (1.7-2.3)  mg/dL


 


Urine Creatinine     (0.1-20.0)  mg/dL


 


Urine Total Protein     (5-11.8)  mg/dL

## 2017-12-02 NOTE — GASTROENTEROLOGY PROGRESS NOTE
Subjective


Interval history: 





Note entered on patient in error; disregard.





Objective





- Constitutional


Vitals: 


 











Temp Pulse Resp BP Pulse Ox


 


 97.8 F   88   18   100/71   97 


 


 12/02/17 08:16  12/02/17 08:16  12/02/17 08:16  12/02/17 08:16  12/02/17 08:16














- Labs


CBC & Chem 7: 


 11/30/17 06:12





 12/02/17 04:17


Labs: 


 Laboratory Results - last 24 hr











  12/01/17 12/01/17 12/01/17





  12:46 15:30 15:30


 


Sodium   


 


Potassium   


 


Chloride   


 


Carbon Dioxide   


 


Anion Gap   


 


BUN   


 


Creatinine   


 


Estimated GFR   


 


BUN/Creatinine Ratio   


 


Glucose   


 


POC Glucose  252 H  


 


Calcium   


 


Phosphorus   


 


Magnesium   


 


Urine Eosinophils    8


 


Urine Creatinine   125.9 H 


 


Protein/Creatinin Ratio   0.24 


 


Urine Total Protein   30 H 


 


Vancomycin Trough   


 


Hepatitis A IgM Ab   


 


Hep Bs Antigen   


 


Hep B Core IgM Ab   


 


Hepatitis C Antibody   


 


HIV 1&2 Antibody Rapid   


 


HIV P24 Antigen   














  12/01/17 12/01/17 12/01/17





  16:18 16:18 17:33


 


Sodium   


 


Potassium   


 


Chloride   


 


Carbon Dioxide   


 


Anion Gap   


 


BUN   


 


Creatinine   


 


Estimated GFR   


 


BUN/Creatinine Ratio   


 


Glucose   


 


POC Glucose    146 H


 


Calcium   


 


Phosphorus   


 


Magnesium   


 


Urine Eosinophils   


 


Urine Creatinine   


 


Protein/Creatinin Ratio   


 


Urine Total Protein   


 


Vancomycin Trough   


 


Hepatitis A IgM Ab  Non-reactive  


 


Hep Bs Antigen  Non-reactive  


 


Hep B Core IgM Ab  Non-reactive  


 


Hepatitis C Antibody  Non-reactive  


 


HIV 1&2 Antibody Rapid   Non react 


 


HIV P24 Antigen   Non react 














  12/01/17 12/02/17 12/02/17





  22:11 04:17 04:17


 


Sodium   138 


 


Potassium   4.1 


 


Chloride   103.0 


 


Carbon Dioxide   25 


 


Anion Gap   14 


 


BUN   24 H 


 


Creatinine   1.4 


 


Estimated GFR   > 60 


 


BUN/Creatinine Ratio   17 


 


Glucose   52 L 


 


POC Glucose  254 H  


 


Calcium   8.2 L 


 


Phosphorus   3.10 


 


Magnesium   1.50 L 


 


Urine Eosinophils   


 


Urine Creatinine   


 


Protein/Creatinin Ratio   


 


Urine Total Protein   


 


Vancomycin Trough    18.3


 


Hepatitis A IgM Ab   


 


Hep Bs Antigen   


 


Hep B Core IgM Ab   


 


Hepatitis C Antibody   


 


HIV 1&2 Antibody Rapid   


 


HIV P24 Antigen   














  12/02/17 12/02/17





  05:37 06:16


 


Sodium  


 


Potassium  


 


Chloride  


 


Carbon Dioxide  


 


Anion Gap  


 


BUN  


 


Creatinine  


 


Estimated GFR  


 


BUN/Creatinine Ratio  


 


Glucose  


 


POC Glucose  58 L  99


 


Calcium  


 


Phosphorus  


 


Magnesium  


 


Urine Eosinophils  


 


Urine Creatinine  


 


Protein/Creatinin Ratio  


 


Urine Total Protein  


 


Vancomycin Trough  


 


Hepatitis A IgM Ab  


 


Hep Bs Antigen  


 


Hep B Core IgM Ab  


 


Hepatitis C Antibody  


 


HIV 1&2 Antibody Rapid  


 


HIV P24 Antigen

## 2017-12-02 NOTE — PROGRESS NOTE
Assessment and Plan


Impression


* Acute renal failure.  


* Diabetic gastroparesis


* Hypertension


* Peripheral edema


* Anemia


* Hypoalbuminemia


Recommendations


* His renal function is improving .  


* His urine fractional excretion of sodium is only 0.2%.  However clinically he 

does not appear to be dry.  His ejection fraction noted to be 55-60% and he 

does not have any pericardial effusion 


* Etiology of his edema remains unclear.  Serum albumin however noted to be 

quite low at 2.5.  Patient may be third spacing due to that.  He however does 

not have any significant dipstick proteinuria as noted above.  His hepatitis B 

and C as well as  HIV test is negative 


*  Renal ultrasound is essentially normal 


* Follow up results of vasculitis workup


* Continue  IV albumin with Lasix.  Would  switch over to Demadex 20 mg twice a 

day at the time of discharge


* Avoid nephrotoxins


* Monitor fluid status and electrolytes closely











Subjective


Date of service: 12/02/17


Principal diagnosis: Anemia, Diarrhea


Interval history: 


Patient feels better today.  Responding to IV albumin with Lasix.  Shortness of 

breath is also better.  Denies any nausea or vomiting








Objective





- Vital Signs


Vital signs: 


 Vital Signs - 12hr











  12/02/17 12/02/17 12/02/17





  05:29 06:13 06:17


 


Temperature 98.2 F 32.1 F L 98.4 F


 


Pulse Rate 88  94 H


 


Respiratory 20 16 18





Rate   


 


Blood Pressure 99/63 104/57 


 


Blood Pressure   104/57





[Left]   


 


O2 Sat by Pulse   97





Oximetry   














  12/02/17





  08:16


 


Temperature 97.8 F


 


Pulse Rate 88


 


Respiratory 18





Rate 


 


Blood Pressure 100/71


 


Blood Pressure 





[Left] 


 


O2 Sat by Pulse 97





Oximetry 














- General Appearance


General appearance: well-developed, well-nourished, appears stated age


EENT: PERRL, mucous membranes moist


Neck: no JVD, no thyromegaly, no carotid bruit, supple


Respiratory: Present: Decreased Breath Sounds (at the bases)


Cardiology: regular, normal heart rate, S1S2, no murmurs


Gastrointestinal: normal, normoactive bowel sounds


Integumentary: other (2+ plus edema)





- Lab





 11/30/17 06:12





 12/02/17 04:17


 Most recent lab results











Calcium  8.2 mg/dL (8.4-10.2)  L  12/02/17  04:17    


 


Phosphorus  3.10 mg/dL (2.5-4.5)   12/02/17  04:17    


 


Magnesium  1.50 mg/dL (1.7-2.3)  L  12/02/17  04:17    


 


Urine Creatinine  125.9 mg/dL (0.1-20.0)  H  12/01/17  15:30    


 


Urine Sodium  32 mmol/L  12/01/17  05:55    


 


Urine Total Protein  30 mg/dL (5-11.8)  H  12/01/17  15:30

## 2017-12-02 NOTE — DISCHARGE SUMMARY
Providers





- Providers


Date of Admission: 


11/29/17 16:39





Attending physician: 


ISA PIERRE MD





 





11/30/17 06:52


Consult to Physician [CONS] Routine 


   Consulting Provider: ALIVIA WORRELL


   Reason For Exam: RENAL INSUFFICIENCY


   Place consult to:: ALIVIA WORRELL


   Notified:: DR. WORRELL


   Phone number called:: INHOUSE


   Was contact made?: Yes


   If yes, spoke with:: 


   Time called:: 09:16





11/30/17 08:43


Consult to Dietitian/Nutrition [CONS] Routine 


   Physician Instructions: 


   Reason For Exam: 


   Reason for Consult: Malnutrition


Consult to Physician [CONS] Routine 


   Consulting Provider: ISIDRO BYRNES


   Reason For Exam: sepsis


   Place consult to:: DR. SHER


   Notified:: 


   Time called:: 09:38





11/30/17 11:50


Consult to Physician [CONS] Routine 


   Consulting Provider: JACKSON CRAVEN


   Reason For Exam: abdominal pain


   Place consult to:: DR. SANDRA CRAVEN


   Notified:: OFFICE


   Phone number called:: [phone]


   Was contact made?: Yes


   If yes, spoke with:: BEATRICE


   Time called:: 12:51





11/30/17 11:51


Consult to Physician [CONS] Routine 


   Consulting Provider: MIKE BENSON


   Reason For Exam: Needs inteventional for vascular access


   Place consult to:: DR. BENSON


   Notified:: DR. BENSON


   Time called:: 12:46





11/30/17 11:52


Consult to Dietitian/Nutrition [CONS] Routine 


   Physician Instructions: 


   Reason For Exam: 


   Reason for Consult: Write/Manage TPN/PPN





11/30/17 15:43


Consult to PICC Line RN [CONS] Routine 


   Reason For Exam: Look at pic on right chest if okay to use


   Type Line:: PICC











Primary care physician: 


PRIMARY CARE MD








Hospitalization


Reason for admission: SEPSIS


Condition: Stable


Hospital course: 


Patient is a 48 yo aam with h/o poorly controlled diabetes, recurrent abdominal 

p ain, and gastroparesis, chronic pancreatitis, and chronically on TPN for years

, who presents with worsening abdominal pain, n/v, and diffuse body aches. He 

reports having chronic abdominal pain for over a decade with associated n/v.  

He has symptoms at baseline, but has periods of worsening symptoms.  Of note, 

patient recently was at [facility] for DKA and sepsis (2/2 tpn line induced bacteremia)

.  Previously plans for gastroparesis included TPN, feeding tube (which he has)

, dm management, and to f/u with specialist at [place].  He reports abd pain 

being diffuse, worsened with meals. Patient was recently admitted to [facility] on was discharged on IV antibiotics for unclear etiology. Chest x-ray 

showed bibasilar infiltrates.  Increased density measured in the mesentery and 

pancreas.  It also showed diffuse patchy alveolar density. Patient was noted to 

have been treated in [place] recently and treated for Bacterimia and completed 

vancomycin. On admission patient was noted to have fever and started back on abx

, cultures were negative. Patient was noted to have ARIANNE and leg swelling, renal 

function improved. Echo cardiogram showed EF of 55-60%, lower ext edema is 

attributed to Malnutrition. Patient was also counselled on Chroinc opioid use. 

Patient was placed on LEVAQUIN TO COMPLETE 5 DAYS FOR THE CYSTITS








Sepsis- Resolved


GNR ruled out.


Chronic Peritoneal irritation secondary to chronic gastroparesis; with 

persistent Nausea and vomiting


Moderate to severe protein calorie malnutrition


Diabetes Mellitus with hyperglycemia


ARIANNE on ckd secondary to vasomotor nephropathy- Resolved


Acute Cystisis-Complex


Hypotension- Monitor closely


Bilateral Lobar Pneumonia possible GNR


Chronic Pancreatitis


Chronic Pain syndrome


Thrombocytopenia


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














Disposition: DC/TX-06 HOME UNDER HOME [facility]


Time spent for discharge: 35 mins





Core Measure Documentation





- Palliative Care


Palliative Care/ Comfort Measures: Not Applicable





- Core Measures


Any of the following diagnoses?: none





- VTE Discharge Requirements


Deep Vein Thrombosis/Pulmonary Embolism Present on Admission: No





Exam





- Physical Exam


Narrative exam: 


  VITAL SIGNS:  Reviewed.    


GENERAL:  The patient appeared in no acute distress sitting up comfortably. 

Vital signs as documented.


HEAD:  No signs of head trauma.


EYES:  Pupils are equal.  Extraocular motions intact.  


EARS:  Hearing grossly intact.


MOUTH:  Oropharynx is normal. 


NECK:  No adenopathy, no JVD.   


CHEST:  Chest with clear breath sounds bilaterally.  No wheezes, rales, or 

rhonchi.  


CARDIAC:  Regular rate and rhythm.  S1 and S2, without murmurs, gallops, or 

rubs.


VASCULAR: 1+ edema bilaterally.  Peripheral pulses normal and equal in all 

extremities.


ABDOMEN:  Soft, PEG tube in place multiple surgical scars noted mild tenderness 

with no rebound or guarding mildly distended but patient chronic.  Due to 

distorted anatomy Not accurately mention on masses.  Hypoactive bowel sounds


MUSCULOSKELETAL:  Right chest wall tunneled catheter Good range of motion of 

all major joints. Extremities without clubbing, cyanosis .  1+ pitting edema 

bilaterally.  


NEUROLOGIC EXAM:  Alert and oriented x 3.  No focal sensory or strength 

deficits.   Speech normal.  Follows commands.


PSYCHIATRIC:  Mood normal.


SKIN:  No rash or lesions.








- Constitutional


Vitals: 


 











Temp Pulse Resp BP Pulse Ox


 


 97.8 F   88   18   100/71   97 


 


 12/02/17 08:16  12/02/17 08:16  12/02/17 08:16  12/02/17 08:16  12/02/17 08:16














Plan


Activity: advance as tolerated, fall precautions


Diet: low salt, other (TPN)


Special Instructions: record daily weights, record daily BP diary, home health 

RN


Additional Instructions: Follow with GI doctor at [facility] as planned 

on December 6th


Follow up with: 


PRIMARY CARE,MD [Primary Care Provider] - 3-5 Days


ALIVIA WORRELL MD [Staff Physician] - 7 Days

## 2017-12-04 LAB — SODIUM SERPL-SCNC: 132 MMOL/L (ref 137–145)

## 2017-12-06 NOTE — QUERY- PRESENT ON ADMISSION
Dear _____Marky____________   Date:____12/6/17_________    



/CDS:____Daviejim / Sergio___________ Phone#:__[phone]________



Exercise your independent professional judgment when responding to this query. 

Questions asked do not imply a particular answer is desired or expected. We 
greatly appreciate your clarification on this issue.

Clinical Documentation States:



47 year old male was admitted on 11/29/17



The discharge summary (Dr. Negro) states "presents with worsening abdominal pain
, n/v, and diffuse body aches 

Reason for admission: SEPSIS

Sepsis- Resolved

GNR ruled out.

Chronic Peritoneal irritation secondary to chronic gastroparesis; with 
persistent Nausea and vomiting

Moderate to severe protein calorie malnutrition "



The Operative report (Dr. Stephens) states " CLINICAL INDICATION: PATIENT WITH A 
FRACTURED TUNNELED CENTRAL VENOUS CATHETER "





Clinical Findings Show:







Based on the above clinical scenario and your knowledge of the patient's case 
please 

clarify if the diagnosis stated below was present on admission:



Diagnosis: _____FRACTURED TUNNELED CENTRAL VENOUS CATHETER__________    





Present on admission :  [y]  Yes (Y)   [ ] Clinically undeterminable(W)   [ ] No
(N)



Please also document response in your Progress Notes and/or Discharge Summary 

and indicate if the condition was present on admission.

MTDD

## 2017-12-11 LAB
ALBUMIN SERPL-MCNC: 2.3 G/DL (ref 3.8–4.8)
GAMMA GLOB SERPL ELPH-MCNC: 1.4 G/DL (ref 0.8–1.7)

## 2017-12-31 ENCOUNTER — HOSPITAL ENCOUNTER (INPATIENT)
Dept: HOSPITAL 5 - ED | Age: 47
LOS: 3 days | Discharge: HOME | DRG: 73 | End: 2018-01-03
Attending: INTERNAL MEDICINE | Admitting: INTERNAL MEDICINE
Payer: COMMERCIAL

## 2017-12-31 DIAGNOSIS — D69.6: ICD-10-CM

## 2017-12-31 DIAGNOSIS — E11.43: Primary | ICD-10-CM

## 2017-12-31 DIAGNOSIS — J15.9: ICD-10-CM

## 2017-12-31 DIAGNOSIS — R18.8: ICD-10-CM

## 2017-12-31 DIAGNOSIS — Z95.4: ICD-10-CM

## 2017-12-31 DIAGNOSIS — K31.84: ICD-10-CM

## 2017-12-31 DIAGNOSIS — I21.A1: ICD-10-CM

## 2017-12-31 DIAGNOSIS — Z88.5: ICD-10-CM

## 2017-12-31 DIAGNOSIS — Z79.899: ICD-10-CM

## 2017-12-31 DIAGNOSIS — Z79.4: ICD-10-CM

## 2017-12-31 DIAGNOSIS — Z90.49: ICD-10-CM

## 2017-12-31 DIAGNOSIS — Z83.3: ICD-10-CM

## 2017-12-31 DIAGNOSIS — Z87.891: ICD-10-CM

## 2017-12-31 DIAGNOSIS — I10: ICD-10-CM

## 2017-12-31 DIAGNOSIS — Z82.49: ICD-10-CM

## 2017-12-31 LAB
ALBUMIN SERPL-MCNC: 3.4 G/DL (ref 3.9–5)
ALT SERPL-CCNC: 17 UNITS/L (ref 7–56)
APTT BLD: 38.1 SEC. (ref 24.2–36.6)
BASOPHILS # (AUTO): 0.1 K/MM3 (ref 0–0.1)
BASOPHILS NFR BLD AUTO: 0.5 % (ref 0–1.8)
BUN SERPL-MCNC: 24 MG/DL (ref 9–20)
BUN/CREAT SERPL: 24 %
CALCIUM SERPL-MCNC: 8.2 MG/DL (ref 8.4–10.2)
EOSINOPHIL # BLD AUTO: 0.1 K/MM3 (ref 0–0.4)
EOSINOPHIL NFR BLD AUTO: 0.8 % (ref 0–4.3)
HCT VFR BLD CALC: 27.4 % (ref 35.5–45.6)
HDLC SERPL-MCNC: 65 MG/DL (ref 40–59)
HEMOLYSIS INDEX: 2
HGB BLD-MCNC: 8.7 GM/DL (ref 11.8–15.2)
INR PPP: 1.17 (ref 0.87–1.13)
LIPASE SERPL-CCNC: 9 UNITS/L (ref 13–60)
LYMPHOCYTES # BLD AUTO: 0.4 K/MM3 (ref 1.2–5.4)
LYMPHOCYTES NFR BLD AUTO: 3.8 % (ref 13.4–35)
MCH RBC QN AUTO: 25 PG (ref 28–32)
MCHC RBC AUTO-ENTMCNC: 32 % (ref 32–34)
MCV RBC AUTO: 79 FL (ref 84–94)
MONOCYTES # (AUTO): 0.6 K/MM3 (ref 0–0.8)
MONOCYTES % (AUTO): 5.5 % (ref 0–7.3)
PLATELET # BLD: 133 K/MM3 (ref 140–440)
RBC # BLD AUTO: 3.47 M/MM3 (ref 3.65–5.03)

## 2017-12-31 PROCEDURE — 82553 CREATINE MB FRACTION: CPT

## 2017-12-31 PROCEDURE — 80048 BASIC METABOLIC PNL TOTAL CA: CPT

## 2017-12-31 PROCEDURE — 71010: CPT

## 2017-12-31 PROCEDURE — 83880 ASSAY OF NATRIURETIC PEPTIDE: CPT

## 2017-12-31 PROCEDURE — 80053 COMPREHEN METABOLIC PANEL: CPT

## 2017-12-31 PROCEDURE — 74177 CT ABD & PELVIS W/CONTRAST: CPT

## 2017-12-31 PROCEDURE — 96375 TX/PRO/DX INJ NEW DRUG ADDON: CPT

## 2017-12-31 PROCEDURE — 82550 ASSAY OF CK (CPK): CPT

## 2017-12-31 PROCEDURE — 82962 GLUCOSE BLOOD TEST: CPT

## 2017-12-31 PROCEDURE — 85025 COMPLETE CBC W/AUTO DIFF WBC: CPT

## 2017-12-31 PROCEDURE — 83690 ASSAY OF LIPASE: CPT

## 2017-12-31 PROCEDURE — 96367 TX/PROPH/DG ADDL SEQ IV INF: CPT

## 2017-12-31 PROCEDURE — 87040 BLOOD CULTURE FOR BACTERIA: CPT

## 2017-12-31 PROCEDURE — 96365 THER/PROPH/DIAG IV INF INIT: CPT

## 2017-12-31 PROCEDURE — 85730 THROMBOPLASTIN TIME PARTIAL: CPT

## 2017-12-31 PROCEDURE — 71275 CT ANGIOGRAPHY CHEST: CPT

## 2017-12-31 PROCEDURE — 85610 PROTHROMBIN TIME: CPT

## 2017-12-31 PROCEDURE — 36415 COLL VENOUS BLD VENIPUNCTURE: CPT

## 2017-12-31 PROCEDURE — 93010 ELECTROCARDIOGRAM REPORT: CPT

## 2017-12-31 PROCEDURE — 80061 LIPID PANEL: CPT

## 2017-12-31 PROCEDURE — 85379 FIBRIN DEGRADATION QUANT: CPT

## 2017-12-31 PROCEDURE — A9502 TC99M TETROFOSMIN: HCPCS

## 2017-12-31 PROCEDURE — 82140 ASSAY OF AMMONIA: CPT

## 2017-12-31 PROCEDURE — 78452 HT MUSCLE IMAGE SPECT MULT: CPT

## 2017-12-31 PROCEDURE — 93017 CV STRESS TEST TRACING ONLY: CPT

## 2017-12-31 PROCEDURE — 93005 ELECTROCARDIOGRAM TRACING: CPT

## 2017-12-31 PROCEDURE — 84484 ASSAY OF TROPONIN QUANT: CPT

## 2017-12-31 PROCEDURE — 99406 BEHAV CHNG SMOKING 3-10 MIN: CPT

## 2017-12-31 PROCEDURE — 96361 HYDRATE IV INFUSION ADD-ON: CPT

## 2017-12-31 PROCEDURE — 94760 N-INVAS EAR/PLS OXIMETRY 1: CPT

## 2017-12-31 NOTE — EMERGENCY DEPARTMENT REPORT
ED Shortness of Breath HPI





- General


Chief Complaint: Dyspnea/Respdistress


Stated Complaint: PARISA/SOB


Time Seen by Provider: 12/31/17 21:33


Source: patient, family, EMS, RN notes reviewed


Mode of arrival: Stretcher


Limitations: No Limitations





- History of Present Illness


MD Complaint: shortness of breath


-: Sudden


Time: 20:00


Severity: severe


Pain Scale: 3


Quality: dull, aching


Consistency: constant


Improves With: nothing


Worsens With: nothing


Associated Symptoms: denies other symptoms


Treatments Prior to Arrival: none





- Related Data


Home Oxygen Therapy: No


 Home Medications











 Medication  Instructions  Recorded  Confirmed  Last Taken


 


Pregabalin [Lyrica] 75 mg PO QDAY 01/26/17 11/29/17 Unknown


 


Lisinopril [Zestril TAB] 2.5 mg PO QDAY 11/29/17 11/29/17 Unknown








 Previous Rx's











 Medication  Instructions  Recorded  Last Taken  Type


 


Diazepam Tab [Valium] 5 mg PO QID PRN #30 tablet 03/21/17 Unknown Rx


 


Hydromorphone HCl [Dilaudid] 4 mg PO Q6H #30 tablet 03/21/17 Unknown Rx


 


Insulin Regular, Human [HumuLIN R] See Protocol SUB-Q ACHS #1 vial 03/21/17 

Unknown Rx


 


Ondansetron [Zofran ODT TAB] 4 mg PO Q8HR #90 tab.rapdis 03/21/17 Unknown Rx











 Allergies











Allergy/AdvReac Type Severity Reaction Status Date / Time


 


codeine Allergy Intermediate Rash Verified 12/01/17 07:31


 


doxorubicin [From Adriamycin] Allergy  Unknown Verified 12/01/17 07:37














ED Review of Systems


ROS: 


Stated complaint: PARISA/SOB


Other details as noted in HPI





Comment: All other systems reviewed and negative


Constitutional: malaise, weakness


Eyes: denies: eye pain


ENT: denies: ear pain


Respiratory: shortness of breath, SOB with exertion, SOB at rest


Cardiovascular: palpitations, dyspnea on exertion.  denies: chest pain


Endocrine: unexplained weight loss


Gastrointestinal: abdominal pain.  denies: nausea, vomiting


Genitourinary: denies: frequency


Musculoskeletal: denies: back pain, joint swelling


Skin: denies: rash


Neurological: weakness


Psychiatric: denies: auditory hallucinations


Hematological/Lymphatic: denies: easy bleeding, easy bruising





ED Past Medical Hx





- Past Medical History


Hx Hypertension: Yes


Hx Diabetes: Yes (insulin pump)


Additional medical history: gastroparesis





- Surgical History


Hx Open Heart Surgery: Yes


Hx Cholecystectomy: Yes


Additional Surgical History: abdominal surgery for malrotation, tricuspid valve 

replacement 2008, cervical fusion, partial gastrectomy "because of 

gastroparesis."





- Social History


Smoking Status: Former Smoker





- Medications


Home Medications: 


 Home Medications











 Medication  Instructions  Recorded  Confirmed  Last Taken  Type


 


Pregabalin [Lyrica] 75 mg PO QDAY 01/26/17 11/29/17 Unknown History


 


Diazepam Tab [Valium] 5 mg PO QID PRN #30 tablet 03/21/17 11/29/17 Unknown Rx


 


Hydromorphone HCl [Dilaudid] 4 mg PO Q6H #30 tablet 03/21/17 11/29/17 Unknown Rx


 


Insulin Regular, Human [HumuLIN R] See Protocol SUB-Q ACHS #1 vial 03/21/17 11/ 29/17 Unknown Rx


 


Ondansetron [Zofran ODT TAB] 4 mg PO Q8HR #90 tab.rapdis 03/21/17 11/29/17 

Unknown Rx


 


Lisinopril [Zestril TAB] 2.5 mg PO QDAY 11/29/17 11/29/17 Unknown History














ED Physical Exam





- General


Limitations: No Limitations


General appearance: alert, lethargic, cachectic





- Head


Head exam: Present: atraumatic, normocephalic, normal inspection





- Eye


Eye exam: Present: normal appearance, EOMI


Pupils: Present: normal accommodation





- ENT


ENT exam: Present: normal exam, mucous membranes dry





- Neck


Neck exam: Present: normal inspection, full ROM





- Respiratory


Respiratory exam: Present: prolonged expiratory





- Cardiovascular


Cardiovascular Exam: Present: tachycardia, normal heart sounds





- GI/Abdominal


GI/Abdominal exam: Present: soft, tenderness.  Absent: organomegaly, mass, 

pulsatile mass





- Rectal


Rectal exam: Present: normal inspection, normal rectal tone, heme (-) stool, 

normal prostate, other (Chaperone was Mr. Aric RN.).  Absent: hemorrhoids, 

mass, tenderness





- Extremities Exam


Extremities exam: Present: full ROM, pedal edema (4+ bilateral pitting edema.)





- Back Exam


Back exam: Absent: vertebral tenderness





- Neurological Exam


Neurological exam: Present: alert, oriented X3





- Psychiatric


Psychiatric exam: Present: depressed, flat affect





- Skin


Skin exam: Present: warm, dry, intact, pallor





ED Course


 Vital Signs











  12/31/17 12/31/17 12/31/17





  21:49 22:12 22:15


 


Temperature 97.5 F L  


 


Pulse Rate 116 H 110 H 108 H


 


Respiratory 20 17 22





Rate   


 


Blood Pressure 96/68  99/69


 


O2 Sat by Pulse 100 100 100





Oximetry   














  12/31/17 12/31/17 12/31/17





  22:30 22:45 22:53


 


Temperature   


 


Pulse Rate 107 H 106 H 105 H


 


Respiratory 20 20 19





Rate   


 


Blood Pressure 100/64 100/64 100/64


 


O2 Sat by Pulse  100 100





Oximetry   














  12/31/17 12/31/17 12/31/17





  23:00 23:11 23:15


 


Temperature   


 


Pulse Rate 107 H 103 H 


 


Respiratory 15 18 20





Rate   


 


Blood Pressure 105/73 105/73 


 


O2 Sat by Pulse 100 100 





Oximetry   














  12/31/17 12/31/17 12/31/17





  23:21 23:30 23:41


 


Temperature   


 


Pulse Rate 102 H 101 H 99 H


 


Respiratory 17 18 20





Rate   


 


Blood Pressure 105/73 111/71 111/71


 


O2 Sat by Pulse 100 100 100





Oximetry   














  12/31/17 01/01/18 01/01/18





  23:51 00:00 00:11


 


Temperature   


 


Pulse Rate 98 H 102 H 100 H


 


Respiratory 18 18 19





Rate   


 


Blood Pressure 111/71 113/83 113/83


 


O2 Sat by Pulse 100 100 100





Oximetry   














  01/01/18 01/01/18 01/01/18





  00:21 00:30 00:41


 


Temperature   


 


Pulse Rate 101 H 99 H 103 H


 


Respiratory 19 22 21





Rate   


 


Blood Pressure 113/83 113/77 113/77


 


O2 Sat by Pulse 100  100





Oximetry   














  01/01/18 01/01/18 01/01/18





  00:51 01:00 01:11


 


Temperature   


 


Pulse Rate 101 H 99 H 99 H


 


Respiratory 21 22 21





Rate   


 


Blood Pressure 113/83 112/78 112/78


 


O2 Sat by Pulse 100 100 100





Oximetry   














  01/01/18 01/01/18 01/01/18





  01:21 01:30 01:41


 


Temperature   


 


Pulse Rate 100 H 98 H 97 H


 


Respiratory 21 21 22





Rate   


 


Blood Pressure 112/78 115/76 115/76


 


O2 Sat by Pulse 100  100





Oximetry   














  01/01/18 01/01/18 01/01/18





  01:51 02:00 02:10


 


Temperature   


 


Pulse Rate 98 H 97 H 


 


Respiratory 25 H 24 16





Rate   


 


Blood Pressure 115/76 110/81 


 


O2 Sat by Pulse 100 100 





Oximetry   














  01/01/18 01/01/18 01/01/18





  02:11 02:21 02:30


 


Temperature   


 


Pulse Rate 96 H 95 H 97 H


 


Respiratory 22 16 21





Rate   


 


Blood Pressure 110/81 110/81 122/74


 


O2 Sat by Pulse 100 100 100





Oximetry   














  01/01/18 01/01/18 01/01/18





  02:41 02:51 03:01


 


Temperature   


 


Pulse Rate 98 H 96 H 97 H


 


Respiratory 25 H 22 24





Rate   


 


Blood Pressure 122/74 122/74 122/74


 


O2 Sat by Pulse 100 100 100





Oximetry   














  01/01/18 01/01/18 01/01/18





  03:10 03:11 04:21


 


Temperature   


 


Pulse Rate  98 H 98 H


 


Respiratory 16 22 





Rate   


 


Blood Pressure  122/74 


 


O2 Sat by Pulse  100 





Oximetry   














  01/01/18





  04:29


 


Temperature 


 


Pulse Rate 99 H


 


Respiratory 20





Rate 


 


Blood Pressure 122/76


 


O2 Sat by Pulse 100





Oximetry 














ED Medical Decision Making





- Lab Data


Result diagrams: 


 12/31/17 22:10





 12/31/17 22:10





- EKG Data


-: EKG Interpreted by Me


EKG shows normal: sinus rhythm


Rate: tachycardia





- EKG Data


When compared to previous EKG there are: no significant change


Interpretation: unchanged when compared t (11/29/2017), nonspecific ST-T wave 

nadir





01/01/18 04:58


Prolonged QT.





- Medical Decision Making





Pulmonary embolism.


Pneumonia.


Congestive heart failure.


Critical Care Time: Yes


Critical care time in (mins) excluding proc time.: 60


Critical care attestation.: 


If time is entered above; I have spent that time in minutes in the direct care 

of this critically ill patient, excluding procedure time.





Critical Care Time: 





60 mins.





ED Disposition


Clinical Impression: 


 Shortness of breath





Pneumonia


Qualifiers:


 Pneumonia type: due to unspecified organism Laterality: bilateral Lung location

: lower lobe of lung Qualified Code(s): J18.9 - Pneumonia, unspecified organism





Disposition: DC-09 OP ADMIT IP TO THIS HOSP


Is pt being admited?: Yes


Does the pt Need Aspirin: No


Condition: Stable


Instructions:  Bacterial Pneumonia (ED)


Referrals: 


SUKUMAR FELDMAN MD [Primary Care Provider] - 3-5 Days

## 2017-12-31 NOTE — XRAY REPORT
FINAL REPORT



PROCEDURE:  XR CHEST 1V AP



TECHNIQUE:  Chest radiograph anteroposterior view. CPT 51723







HISTORY:  shortness of breath 



COMPARISON:  No prior studies are available for comparison.



FINDINGS:  

Sternotomy wires are visualized. A prosthetic heart valve is also

visualized. Right jugular central venous line appears to be in

place. The tip projects in the proximal SVC. The heart is

enlarged. Pulmonary vasculature is not distended. A small amount

of patchy density is present in the right lower lobe suggesting

atelectasis or infiltrate. Thin linear bands of atelectasis are

seen in the left base. There is mild blunting of the right

lateral costophrenic angle suggesting minimal right effusion. 



IMPRESSION:  

Prior thoracotomy. Prosthetic heart valve in place.. Mild

cardiomegaly. 



Small amount of patchy alveolar density right lower lobe

suggesting atelectasis versus pneumonia. Minimal right effusion

also appears to be present. 



Right jugular central venous line in place. No evidence of

pneumothorax.

## 2018-01-01 RX ADMIN — PIPERACILLIN SODIUM AND TAZOBACTAM SODIUM SCH MLS/HR: 3; .375 INJECTION, POWDER, LYOPHILIZED, FOR SOLUTION INTRAVENOUS at 14:03

## 2018-01-01 RX ADMIN — MORPHINE SULFATE PRN MG: 2 INJECTION, SOLUTION INTRAMUSCULAR; INTRAVENOUS at 18:31

## 2018-01-01 RX ADMIN — INSULIN ASPART SCH UNITS: 100 INJECTION, SOLUTION INTRAVENOUS; SUBCUTANEOUS at 14:02

## 2018-01-01 RX ADMIN — INSULIN ASPART SCH: 100 INJECTION, SOLUTION INTRAVENOUS; SUBCUTANEOUS at 17:06

## 2018-01-01 RX ADMIN — MORPHINE SULFATE PRN MG: 2 INJECTION, SOLUTION INTRAMUSCULAR; INTRAVENOUS at 22:24

## 2018-01-01 RX ADMIN — VANCOMYCIN HYDROCHLORIDE ONE MLS/HR: 1 INJECTION, POWDER, LYOPHILIZED, FOR SOLUTION INTRAVENOUS at 06:15

## 2018-01-01 RX ADMIN — INSULIN ASPART SCH UNITS: 100 INJECTION, SOLUTION INTRAVENOUS; SUBCUTANEOUS at 10:38

## 2018-01-01 RX ADMIN — INSULIN ASPART SCH: 100 INJECTION, SOLUTION INTRAVENOUS; SUBCUTANEOUS at 21:38

## 2018-01-01 RX ADMIN — PIPERACILLIN SODIUM AND TAZOBACTAM SODIUM SCH MLS/HR: 3; .375 INJECTION, POWDER, LYOPHILIZED, FOR SOLUTION INTRAVENOUS at 21:36

## 2018-01-01 RX ADMIN — ONDANSETRON PRN MG: 2 INJECTION INTRAMUSCULAR; INTRAVENOUS at 18:31

## 2018-01-01 RX ADMIN — VANCOMYCIN HYDROCHLORIDE ONE MLS/HR: 1 INJECTION, POWDER, LYOPHILIZED, FOR SOLUTION INTRAVENOUS at 06:20

## 2018-01-01 RX ADMIN — SODIUM CHLORIDE SCH MLS/HR: 0.45 INJECTION, SOLUTION INTRAVENOUS at 10:39

## 2018-01-01 RX ADMIN — MORPHINE SULFATE PRN MG: 2 INJECTION, SOLUTION INTRAMUSCULAR; INTRAVENOUS at 14:03

## 2018-01-01 RX ADMIN — MORPHINE SULFATE PRN MG: 2 INJECTION, SOLUTION INTRAMUSCULAR; INTRAVENOUS at 10:47

## 2018-01-01 NOTE — CAT SCAN REPORT
FINAL REPORT



PROCEDURE:  CT ANGIO CHEST



TECHNIQUE:  Computerized tomographic angiography of the chest was

performed after the IV injection of iodinated nonionic contrast

including image processing. The image data was postprocessed

using 2-dimensional multiplanar reformatted (MPR) and

3-dimensional (MIP and/or volume rendered) techniques. 



HISTORY:  Elevated D-Dimer, r/o PE. 



COMPARISON:  No prior studies are available for comparison.



FINDINGS:  

Heart and pericardium: Normal.



Thoracic aorta: There is no thoracic aortic aneurysm or

dissection..



Pulmonary vasculature: There is no pulmonary embolism..



Lymph nodes: There are enlarged mediastinal and bilateral hilar

lymph nodes..



Lungs: There is suboptimal inspiration. There is pulmonary edema

at the lung bases. There are infiltrates at the lung bases worse

on the right..



Pleural space: There is a right pleural effusion. There is no

pneumothorax..



Musculoskeletal structures: No significant abnormality.



Upper abdominal structures: No significant abnormality.



IMPRESSION:  





There is no thoracic aortic aneurysm or dissection..



There is no pulmonary embolism..



There are enlarged mediastinal and bilateral hilar lymph nodes..

These could be reactive. Neoplasia not excluded. 



There is pulmonary edema at the lung bases. There are infiltrates

at the lung bases worse on the right..



There is a right pleural effusion. There is no pneumothorax..

## 2018-01-01 NOTE — PROGRESS NOTE
Assessment and Plan


Assessment and plan: 





47-year-old man with a history of diabetes dictated by Gastroparesis was 

discharged from the hospital on December 2 for pneumonia return to the 

emergency room with complaints of nausea vomiting, unable to tolerate oral 

intake.  He continues to have shortness of breath and a cough productive of 

yellow phlegm which has not changed.  He stated he has completed his course of 

antibiotics for pneumonia.  





HCAP


continue abx








Gastroparesis acute on chronic


continue anti-emetics, ADAT





Diabetes


continue SSI





Mild ascites


asymptomatic NTD





Thrombocytopenia


stable and improved





Abnormal cardiac enzymes


have now trended down, Stress test when improved











History


Interval history: 


admits malaise, fatigue


has cough with scant sputum, improved


Review of systems


Constitutional: No fevers, , no joint pains





CVS: No chest pain, no orthopnea, no dyspnea on exertion, no pedal edema





GI: No abdominal pain, no diarrhea, no vomiting, no constipation, admits to 

mild nausea





Respiratory: No shortness of breath, no wheezing, 





Hospitalist Physical





- Physical exam


Narrative exam: 





General.: Appears well, no distress, nontoxic


HEENT: Moist mucous membranes, extraocular muscles intact, no lymphadenopathy


Neck: supple


Cardiac: S1-S2 heard


Lungs: clear to auscultation bilaterally


Abdomen: soft , nontender,  nondistended,  bowel sounds positive


Extremities: no edema clubbing or cyanosis


Skin: no rash or lesions


Neurologic: no gross focal deficits


Psych: appropriate behavior, appropriate mood, corporative, judgment intact





- Constitutional


Vitals: 


 











Temp Pulse Resp BP Pulse Ox


 


 98.4 F   109 H  20   118/83   95 


 


 01/01/18 08:35  01/01/18 08:35  01/01/18 08:35  01/01/18 08:35  01/01/18 08:35














Results





- Labs


CBC & Chem 7: 


 01/02/18 06:55





 01/02/18 06:55


Labs: 


 Laboratory Last Values











WBC  11.2 K/mm3 (4.5-11.0)  H  12/31/17  22:10    


 


RBC  3.47 M/mm3 (3.65-5.03)  L  12/31/17  22:10    


 


Hgb  8.7 gm/dl (11.8-15.2)  L  12/31/17  22:10    


 


Hct  27.4 % (35.5-45.6)  L  12/31/17  22:10    


 


MCV  79 fl (84-94)  L  12/31/17  22:10    


 


MCH  25 pg (28-32)  L  12/31/17  22:10    


 


MCHC  32 % (32-34)   12/31/17  22:10    


 


RDW  17.5 % (13.2-15.2)  H  12/31/17  22:10    


 


Plt Count  133 K/mm3 (140-440)  L  12/31/17  22:10    


 


Lymph % (Auto)  3.8 % (13.4-35.0)  L  12/31/17  22:10    


 


Mono % (Auto)  5.5 % (0.0-7.3)   12/31/17  22:10    


 


Eos % (Auto)  0.8 % (0.0-4.3)   12/31/17  22:10    


 


Baso % (Auto)  0.5 % (0.0-1.8)   12/31/17  22:10    


 


Lymph #  0.4 K/mm3 (1.2-5.4)  L  12/31/17  22:10    


 


Mono #  0.6 K/mm3 (0.0-0.8)   12/31/17  22:10    


 


Eos #  0.1 K/mm3 (0.0-0.4)   12/31/17  22:10    


 


Baso #  0.1 K/mm3 (0.0-0.1)   12/31/17  22:10    


 


Seg Neutrophils %  89.4 % (40.0-70.0)  H  12/31/17  22:10    


 


Seg Neutrophils #  10.0 K/mm3 (1.8-7.7)  H  12/31/17  22:10    


 


PT  15.5 Sec. (12.2-14.9)  H  12/31/17  22:10    


 


INR  1.17  (0.87-1.13)  H  12/31/17  22:10    


 


APTT  38.1 Sec. (24.2-36.6)  H  12/31/17  22:10    


 


D-Dimer  3977.53 ng/mlDDU (0-234)  H  12/31/17  22:10    


 


Sodium  133 mmol/L (137-145)  L  12/31/17  22:10    


 


Potassium  4.1 mmol/L (3.6-5.0)   12/31/17  22:10    


 


Chloride  99.0 mmol/L ()   12/31/17  22:10    


 


Carbon Dioxide  17 mmol/L (22-30)  L  12/31/17  22:10    


 


Anion Gap  21 mmol/L  12/31/17  22:10    


 


BUN  24 mg/dL (9-20)  H  12/31/17  22:10    


 


Creatinine  1.0 mg/dL (0.8-1.5)   12/31/17  22:10    


 


Estimated GFR  > 60 ml/min  12/31/17  22:10    


 


BUN/Creatinine Ratio  24 %  12/31/17  22:10    


 


Glucose  189 mg/dL ()  H  12/31/17  22:10    


 


POC Glucose  180  ()  H  01/01/18  11:29    


 


Lactic Acid  1.60 mmol/L (0.7-2.0)   01/01/18  05:36    


 


Calcium  8.2 mg/dL (8.4-10.2)  L  12/31/17  22:10    


 


Total Bilirubin  0.60 mg/dL (0.1-1.2)   12/31/17  22:10    


 


AST  35 units/L (5-40)   12/31/17  22:10    


 


ALT  17 units/L (7-56)   12/31/17  22:10    


 


Alkaline Phosphatase  201 units/L ()  H  12/31/17  22:10    


 


Total Creatine Kinase  43 units/L ()  L  01/01/18  13:09    


 


CK-MB (CK-2)  1.8 ng/mL (0.0-4.0)   01/01/18  13:09    


 


CK-MB (CK-2) Rel Index  4.1  (0-4)  H  01/01/18  13:09    


 


Troponin T  0.025 ng/mL (0.00-0.029)   01/01/18  13:09    


 


NT-Pro-B Natriuret Pep  730.5 pg/mL (0-450)  H  12/31/17  22:10    


 


Total Protein  7.6 g/dL (6.3-8.2)   12/31/17  22:10    


 


Albumin  3.4 g/dL (3.9-5)  L  12/31/17  22:10    


 


Albumin/Globulin Ratio  0.8 %  12/31/17  22:10    


 


Triglycerides  45 mg/dL (2-149)   12/31/17  22:10    


 


Cholesterol  99 mg/dL ()   12/31/17  22:10    


 


LDL Cholesterol Direct  25 mg/dL ()  L  12/31/17  22:10    


 


HDL Cholesterol  65 mg/dL (40-59)  H  12/31/17  22:10    


 


Cholesterol/HDL Ratio  1.52 %  12/31/17  22:10    


 


Lipase  9 units/L (13-60)  L  12/31/17  22:10

## 2018-01-01 NOTE — CAT SCAN REPORT
FINAL REPORT



EXAM:  CT ABDOMEN PELVIS W CON



HISTORY:  Abdominal pain. 



TECHNIQUE:  Axial CT images of the abdomen and pelvis were

obtained, following the administration of intravenous contrast.

Delayed axial images and coronal and sagittal reformatted images

also obtained. Comparison is made with prior study 11/29/2017. 



FINDINGS:  

There is mild diffuse periportal edema, better demonstrated on

the current postcontrast images (compared to prior unenhanced

exam). This is nonspecific but probably due to cardiac

dysfunction. There is mild diffuse abdominal ascites, slightly

progressed in the interval. The patient is status post

cholecystectomy. Multiple surgical clips are again noted along

the body and tail of the pancreas. The margins of the pancreas

are not clearly delineated. There is mild diffuse low

attenuation/edema seen within the mesentery and retroperitoneum,

similar in appearance compared to prior exam. Underlying acute

pancreatitis is not excluded. There is borderline splenomegaly,

with spleen measuring 12.2 cm in length. The biliary tree,

adrenal glands, and kidneys are unremarkable. 



Evaluation of the bowel is limited due to lack of oral contrast.

A percutaneous gastrostomy is present with its balloon within the

gastric lumen. There is residual stool in the colon. There is no

intestinal obstruction or free air. The appendix is not

discretely visualized, but there is no pericecal mass or

inflammatory change to suggest acute appendicitis 



The abdominal aorta is normal in caliber. There is no pathologic

abdominal or pelvic lymphadenopathy. There is mild diffuse

subcutaneous/soft tissue anasarca. The prostate gland is normal

in size. The urinary bladder is unremarkable. 



There is trace 2 mm retrolisthesis of L4 on L5, stable. The

patient is status post median sternotomy, with prosthetic heart

valve. Moderate infiltrates are seen at both lung bases, also

seen on prior exam. There is a small to moderate right pleural

effusion, increased compared to prior exam. There is associated

increased consolidation of the posterior right lower lobe, likely

atelectasis.



IMPRESSION:  

1. Mild diffuse periportal edema. Mild diffuse abdominal ascites

and mild diffuse subcutaneous/soft tissue anasarca. 



2. Nonspecific low-attenuation/edema in the central mesentery and

retroperitoneum, similar appearance compared to prior exam.

Multiple surgical clips along the pancreas, though the pancreatic

margins are not well delineated. Underlying acute pancreatitis is

not excluded. 



3. Status post placement of percutaneous gastrostomy. No

intestinal obstruction or free air. 



4. Borderline splenomegaly. 



5. Moderate bibasilar pulmonary infiltrates. Small to moderate

right pleural effusion and associated consolidation at the right

posterior lung base, progressed in the interval.

## 2018-01-01 NOTE — HISTORY AND PHYSICAL REPORT
History of Present Illness


Date of examination: 01/01/18


Date of admission: 


01/01/18 05:05





History of present illness: 


47-year-old man with a history of diabetes dictated by Gastroparesis was 

discharged from the hospital on December 2 for pneumonia return to the 

emergency room with complaints of nausea vomiting, unable to tolerate oral 

intake.  He continues to have shortness of breath and a cough productive of 

yellow phlegm which has not changed.  He stated he has completed his course of 

antibiotics for pneumonia.  Also complaining of right flank pain which she 

describes as sharp pain, constant starting today, intensity 7/10, and the 

radiation he cannot identify chest pain or relieving factors


Review Of  Systems:


Constitutional: no weight loss


Ears, eyes, nose, mouth and throat: no nasal congestion, no nasal discharge, no 

sinus pressure, blurry vision, diplopia


Neck: No neck pain or rigidity.


Cardiovascular: No chest pain,  palpitations


Respiratory: + shortness of breath, cough


Gastrointestinal: No abdominal pain, hematochezia


Genitourinary : no dysuria, frequency , hematuria


Musculoskeletal: no muscle ache 


Integumentary: no rash, no pruritis


Neurological: no parathesias, focal weakness


Endocrine: no cold or heat intolerance, no polyuria or polydipsia


Hematologic/Lymphatic: no easy bruising, no easy bleeding, no gland swelling


Allergic/Immunologic: no urticaria, no angioedema.





PAST MEDICAL HISTORY:diabetes dictated by Gastroparesis





PAST SURGICAL HISTORY: Cholecystectomy, tri-cuspid valve replacement, neck 

surgery, partial gastrectomy





FAMILY HISTORY: Hypertension, diabetes





SOCIAL HISTORY: Quit smoking, denies alcohol, drugs








Medications and Allergies


 Allergies











Allergy/AdvReac Type Severity Reaction Status Date / Time


 


codeine Allergy Intermediate Rash Verified 12/01/17 07:31


 


doxorubicin [From Adriamycin] Allergy  Unknown Verified 12/01/17 07:37











 Home Medications











 Medication  Instructions  Recorded  Confirmed  Last Taken  Type


 


Pregabalin [Lyrica] 75 mg PO QDAY 01/26/17 11/29/17 Unknown History


 


Diazepam Tab [Valium] 5 mg PO QID PRN #30 tablet 03/21/17 11/29/17 Unknown Rx


 


Hydromorphone HCl [Dilaudid] 4 mg PO Q6H #30 tablet 03/21/17 11/29/17 Unknown Rx


 


Insulin Regular, Human [HumuLIN R] See Protocol SUB-Q ACHS #1 vial 03/21/17 11/ 29/17 Unknown Rx


 


Ondansetron [Zofran ODT TAB] 4 mg PO Q8HR #90 tab.rapdis 03/21/17 11/29/17 

Unknown Rx


 


Lisinopril [Zestril TAB] 2.5 mg PO QDAY 11/29/17 11/29/17 Unknown History











Active Meds: 


Active Medications





Acetaminophen (Tylenol)  650 mg PO Q4H PRN


   PRN Reason: Pain MILD(1-3)/Fever >100.5/HA


Bisacodyl (Dulcolax)  10 mg NH QDAY PRN


   PRN Reason: Constipation unrelieved by MOM


Dextrose (D50w (25gm) Syringe)  50 ml IV PRN PRN


   PRN Reason: Hypoglycemia


Vancomycin HCl (Vancomycin/Ns 1 Gm/250 Ml)  1 gm in 250 mls @ 167.007 mls/hr IV 

ONCE.ED ONE


   PRN Reason: Protocol


   Stop: 01/01/18 07:29


   Last Admin: 01/01/18 06:20 Dose:  167.007 mls/hr


Sodium Chloride (Nacl 0.45% 1000 Ml)  1,000 mls @ 100 mls/hr IV AS DIRECT TRISTIAN


Piperacillin Sod/Tazobactam Sod (Zosyn/Ns 3.375gm/50ml)  3.375 gm in 50 mls @ 

100 mls/hr IV Q8HR TRISTIAN


   PRN Reason: Protocol


Insulin Aspart (Novolog)  0 units SUB-Q ACHS TRISTIAN


   PRN Reason: Protocol


Magnesium Hydroxide (Milk Of Magnesia)  30 ml PO Q4H PRN


   PRN Reason: Constipation


Metoclopramide HCl (Reglan)  10 mg IV Q6H PRN


   PRN Reason: Nausea And Vomiting


Metoclopramide HCl (Reglan)  10 mg IV Q6H PRN


   PRN Reason: Nausea And Vomiting


Morphine Sulfate (Morphine)  2 mg IV Q4H PRN


   PRN Reason: Pain, Moderate (4-6)


Ondansetron HCl (Zofran)  4 mg IV Q4H PRN


   PRN Reason: N/V unrelieved by Reglan











Exam





- Physical Exam


Narrative exam: 


Gen. appearance: Patient lying in bed in no acute distress


HEENT: Normocephalic/atraumatic, pupils equal round reactive to light, extra 

occular movement intact, no scleral icterus, no JVD or thyromegaly or nodule, 

neck is supple, mucous membrane moist, no erythema or exudate


Heart: S1-S2, regular rate and rhythm


Lungs: Clear to auscultation bilateral breathing comfortable


Abdomen: Positive bowel sounds,+g-tube,  nontender, nondistended, no 

organomegaly


Extremities: No edema, cyanosis, clubbing


Neuro:: Oriented 3 , cranial nerves II-12 intact, speech, motor intact


Skin: No rash, nodules, warm dry





- Constitutional


Vitals: 


 











Temp Pulse Resp BP Pulse Ox


 


 97.5 F L  113 H  27 H  129/84   87 


 


 12/31/17 21:49  01/01/18 06:15  01/01/18 06:15  01/01/18 06:15  01/01/18 06:15














Results





- Labs


CBC & Chem 7: 


 12/31/17 22:10





 12/31/17 22:10


Labs: 


 Abnormal lab results











  12/31/17 12/31/17 12/31/17 Range/Units





  22:10 22:10 22:10 


 


WBC  11.2 H    (4.5-11.0)  K/mm3


 


RBC  3.47 L    (3.65-5.03)  M/mm3


 


Hgb  8.7 L    (11.8-15.2)  gm/dl


 


Hct  27.4 L    (35.5-45.6)  %


 


MCV  79 L    (84-94)  fl


 


MCH  25 L    (28-32)  pg


 


RDW  17.5 H    (13.2-15.2)  %


 


Plt Count  133 L    (140-440)  K/mm3


 


Lymph % (Auto)  3.8 L    (13.4-35.0)  %


 


Lymph #  0.4 L    (1.2-5.4)  K/mm3


 


Seg Neutrophils %  89.4 H    (40.0-70.0)  %


 


Seg Neutrophils #  10.0 H    (1.8-7.7)  K/mm3


 


PT   15.5 H   (12.2-14.9)  Sec.


 


INR   1.17 H   (0.87-1.13)  


 


APTT   38.1 H   (24.2-36.6)  Sec.


 


D-Dimer   3977.53 H   (0-234)  ng/mlDDU


 


Sodium    133 L  (137-145)  mmol/L


 


Carbon Dioxide    17 L  (22-30)  mmol/L


 


BUN    24 H  (9-20)  mg/dL


 


Glucose    189 H  ()  mg/dL


 


Calcium    8.2 L  (8.4-10.2)  mg/dL


 


Alkaline Phosphatase    201 H  ()  units/L


 


Total Creatine Kinase     ()  units/L


 


Troponin T     (0.00-0.029)  ng/mL


 


NT-Pro-B Natriuret Pep    730.5 H  (0-450)  pg/mL


 


Albumin    3.4 L  (3.9-5)  g/dL


 


LDL Cholesterol Direct     ()  mg/dL


 


HDL Cholesterol     (40-59)  mg/dL


 


Lipase    9 L  (13-60)  units/L














  12/31/17 Range/Units





  22:10 


 


WBC   (4.5-11.0)  K/mm3


 


RBC   (3.65-5.03)  M/mm3


 


Hgb   (11.8-15.2)  gm/dl


 


Hct   (35.5-45.6)  %


 


MCV   (84-94)  fl


 


MCH   (28-32)  pg


 


RDW   (13.2-15.2)  %


 


Plt Count   (140-440)  K/mm3


 


Lymph % (Auto)   (13.4-35.0)  %


 


Lymph #   (1.2-5.4)  K/mm3


 


Seg Neutrophils %   (40.0-70.0)  %


 


Seg Neutrophils #   (1.8-7.7)  K/mm3


 


PT   (12.2-14.9)  Sec.


 


INR   (0.87-1.13)  


 


APTT   (24.2-36.6)  Sec.


 


D-Dimer   (0-234)  ng/mlDDU


 


Sodium   (137-145)  mmol/L


 


Carbon Dioxide   (22-30)  mmol/L


 


BUN   (9-20)  mg/dL


 


Glucose   ()  mg/dL


 


Calcium   (8.4-10.2)  mg/dL


 


Alkaline Phosphatase   ()  units/L


 


Total Creatine Kinase  52 L  ()  units/L


 


Troponin T  0.047 H  (0.00-0.029)  ng/mL


 


NT-Pro-B Natriuret Pep   (0-450)  pg/mL


 


Albumin   (3.9-5)  g/dL


 


LDL Cholesterol Direct  25 L  ()  mg/dL


 


HDL Cholesterol  65 H  (40-59)  mg/dL


 


Lipase   (13-60)  units/L














- Imaging and Cardiology


CT scan - abdomen: report reviewed


CT scan - chest: report reviewed


CT scan - pelvis: report reviewed





Assessment and Plan


Assessment


HCAP


Gastroparesis acute on chronic


Diabetes


Mild ascites


Thrombocytopenia


Abnormal cardiac enzymes


Plan


Admit to medicine


Start IV fluids, IV antibiotics, antiemetics


Check fingersticks initiate insulin sliding scale,, IV morphine


DVT prophylaxis, check cardiac enzymes

## 2018-01-02 LAB
BASOPHILS # (AUTO): 0 K/MM3 (ref 0–0.1)
BASOPHILS NFR BLD AUTO: 1 % (ref 0–1.8)
BUN SERPL-MCNC: 13 MG/DL (ref 9–20)
BUN/CREAT SERPL: 19 %
CALCIUM SERPL-MCNC: 8.3 MG/DL (ref 8.4–10.2)
EOSINOPHIL # BLD AUTO: 0.3 K/MM3 (ref 0–0.4)
EOSINOPHIL NFR BLD AUTO: 6.5 % (ref 0–4.3)
HCT VFR BLD CALC: 26.7 % (ref 35.5–45.6)
HEMOLYSIS INDEX: 2
HGB BLD-MCNC: 8.4 GM/DL (ref 11.8–15.2)
LYMPHOCYTES # BLD AUTO: 0.8 K/MM3 (ref 1.2–5.4)
LYMPHOCYTES NFR BLD AUTO: 18.4 % (ref 13.4–35)
MCH RBC QN AUTO: 25 PG (ref 28–32)
MCHC RBC AUTO-ENTMCNC: 32 % (ref 32–34)
MCV RBC AUTO: 79 FL (ref 84–94)
MONOCYTES # (AUTO): 0.6 K/MM3 (ref 0–0.8)
MONOCYTES % (AUTO): 12.8 % (ref 0–7.3)
PLATELET # BLD: 120 K/MM3 (ref 140–440)
RBC # BLD AUTO: 3.39 M/MM3 (ref 3.65–5.03)

## 2018-01-02 RX ADMIN — MORPHINE SULFATE PRN MG: 2 INJECTION, SOLUTION INTRAMUSCULAR; INTRAVENOUS at 06:17

## 2018-01-02 RX ADMIN — MORPHINE SULFATE PRN MG: 2 INJECTION, SOLUTION INTRAMUSCULAR; INTRAVENOUS at 23:31

## 2018-01-02 RX ADMIN — PIPERACILLIN SODIUM AND TAZOBACTAM SODIUM SCH MLS/HR: 3; .375 INJECTION, POWDER, LYOPHILIZED, FOR SOLUTION INTRAVENOUS at 15:19

## 2018-01-02 RX ADMIN — INSULIN ASPART SCH UNITS: 100 INJECTION, SOLUTION INTRAVENOUS; SUBCUTANEOUS at 10:59

## 2018-01-02 RX ADMIN — SODIUM CHLORIDE SCH MLS/HR: 0.45 INJECTION, SOLUTION INTRAVENOUS at 00:26

## 2018-01-02 RX ADMIN — PIPERACILLIN SODIUM AND TAZOBACTAM SODIUM SCH: 3; .375 INJECTION, POWDER, LYOPHILIZED, FOR SOLUTION INTRAVENOUS at 22:24

## 2018-01-02 RX ADMIN — PIPERACILLIN SODIUM AND TAZOBACTAM SODIUM SCH MLS/HR: 3; .375 INJECTION, POWDER, LYOPHILIZED, FOR SOLUTION INTRAVENOUS at 06:18

## 2018-01-02 RX ADMIN — MORPHINE SULFATE PRN MG: 2 INJECTION, SOLUTION INTRAMUSCULAR; INTRAVENOUS at 10:59

## 2018-01-02 RX ADMIN — INSULIN ASPART SCH UNITS: 100 INJECTION, SOLUTION INTRAVENOUS; SUBCUTANEOUS at 14:38

## 2018-01-02 RX ADMIN — MORPHINE SULFATE PRN MG: 2 INJECTION, SOLUTION INTRAMUSCULAR; INTRAVENOUS at 01:54

## 2018-01-02 RX ADMIN — ONDANSETRON PRN MG: 2 INJECTION INTRAMUSCULAR; INTRAVENOUS at 14:39

## 2018-01-02 RX ADMIN — MORPHINE SULFATE PRN MG: 2 INJECTION, SOLUTION INTRAMUSCULAR; INTRAVENOUS at 14:38

## 2018-01-02 RX ADMIN — SODIUM CHLORIDE SCH MLS/HR: 0.45 INJECTION, SOLUTION INTRAVENOUS at 11:00

## 2018-01-03 VITALS — SYSTOLIC BLOOD PRESSURE: 142 MMHG | DIASTOLIC BLOOD PRESSURE: 88 MMHG

## 2018-01-03 RX ADMIN — INSULIN ASPART SCH: 100 INJECTION, SOLUTION INTRAVENOUS; SUBCUTANEOUS at 07:30

## 2018-01-03 RX ADMIN — INSULIN ASPART SCH UNITS: 100 INJECTION, SOLUTION INTRAVENOUS; SUBCUTANEOUS at 12:44

## 2018-01-03 RX ADMIN — MORPHINE SULFATE PRN MG: 2 INJECTION, SOLUTION INTRAMUSCULAR; INTRAVENOUS at 10:12

## 2018-01-03 RX ADMIN — SODIUM CHLORIDE SCH MLS/HR: 0.45 INJECTION, SOLUTION INTRAVENOUS at 04:43

## 2018-01-03 RX ADMIN — ONDANSETRON PRN MG: 2 INJECTION INTRAMUSCULAR; INTRAVENOUS at 10:12

## 2018-01-03 RX ADMIN — INSULIN ASPART SCH UNITS: 100 INJECTION, SOLUTION INTRAVENOUS; SUBCUTANEOUS at 00:51

## 2018-01-03 RX ADMIN — MORPHINE SULFATE PRN MG: 2 INJECTION, SOLUTION INTRAMUSCULAR; INTRAVENOUS at 14:19

## 2018-01-03 RX ADMIN — INSULIN ASPART SCH UNITS: 100 INJECTION, SOLUTION INTRAVENOUS; SUBCUTANEOUS at 00:25

## 2018-01-03 RX ADMIN — ONDANSETRON PRN MG: 2 INJECTION INTRAMUSCULAR; INTRAVENOUS at 14:32

## 2018-01-03 RX ADMIN — PIPERACILLIN SODIUM AND TAZOBACTAM SODIUM SCH MLS/HR: 3; .375 INJECTION, POWDER, LYOPHILIZED, FOR SOLUTION INTRAVENOUS at 06:28

## 2018-01-03 NOTE — DISCHARGE SUMMARY
Providers





- Providers


Date of Admission: 


01/01/18 05:05





Attending physician: 


COURT MCLEOD MD





Primary care physician: 


SUKUMAR FELDMAN








Hospitalization


Condition: Stable


Hospital course: 





47-year-old man with a history of diabetic gastroparesis who is on TPN at home.

  Patient presented with cough which was productive shortness of breath and 

intractable nausea vomiting.  He received IV antibiotics for pneumonia, he 

clinically improved.  He received antiemetics for gastroparesis, his diet was 

advanced and he tolerated it well.  He did have abnormal cardiac enzymes which 

trended down on dapsone, these were thought to be due to demand ischemia, 

patient went on to have a stress test that was negative.  Patient stabilized 

and was subsequently discharged home.





Discharge diagnoses


Pneumonia, bacterial most likely gram-positive


Diabetic gastroparesis


Type 2 diabetes


Thrombocytopenia, mild


Elevated troponin due to demand ischemia, type II MI











Disposition: DC-01 TO HOME OR SELFCARE


Time spent for discharge: 33 minutes





Core Measure Documentation





- Palliative Care


Palliative Care/ Comfort Measures: Not Applicable





- Core Measures


Any of the following diagnoses?: none





Exam





- Physical Exam


Narrative exam: 





General.: Appears well, no distress, nontoxic


HEENT: Moist mucous membranes, extraocular muscles intact, no lymphadenopathy


Neck: supple


Cardiac: S1-S2 heard


Lungs: clear to auscultation bilaterally


Abdomen: soft , nontender,  nondistended,  bowel sounds positive


Extremities: no edema clubbing or cyanosis


Skin: no rash or lesions


Neurologic: no gross focal deficits


Psych: appropriate behavior, appropriate mood, corporative, judgment intact





- Constitutional


Vitals: 


 











Temp Pulse Resp BP Pulse Ox


 


 98.7 F   83   20   141/85   99 


 


 01/03/18 07:44  01/03/18 08:56  01/03/18 07:44  01/03/18 08:56  01/03/18 07:44














Plan


Follow up with: 


SUKUMAR FELDMAN MD [Primary Care Provider] - 3-5 Days


Prescriptions: 


Amoxicillin/Potassium Clav [Augmentin 875-125 Tablet] 1 each PO Q12H #10 tablet


Diazepam Tab [Valium] 5 mg PO QID PRN #20 tablet


 PRN Reason: Anxiety


Hydromorphone HCl [Dilaudid] 4 mg PO Q6H #20 tablet


Insulin Regular, Human [HumuLIN R] See Protocol SUB-Q ACHS #1 vial


Ondansetron [Zofran ODT TAB] 4 mg PO Q8HR #90 tab.lisadis

## 2018-01-03 NOTE — TREADMILL REPORT
NUCLEAR CARDIAC IMAGING



INDICATION FOR PROCEDURE:  Chest pain.  Informed consent was obtained.



Vasodilator stress was achieved with intravenous administration of 0.4 mg of

Lexiscan.  Nuclear cardiac imaging was performed per protocol.  Resting nuclear

cardiac images were obtained 45-60 minutes following the intravenous

administration of 10 mCi of technetium-99m Myoview.  Stress imaging was

performed 30-45 minutes following the intravenous administration of 28 mCi of

technetium-99m Myoview.



Gated SPECT imaging demonstrates a left ventricular ejection fraction of 58%

with normal wall motion.  Myocardial perfusion imaging demonstrates no

significant cavity change between stress and rest.  No significant stress

induced perfusion defects are seen.



Nuclear cardiac imaging demonstrates grossly normal post-stress left ventricular

systolic function with no significant evidence for myocardial ischemia or

necrosis.





DD: 01/03/2018 10:35

DT: 01/03/2018 22:17

[facility]# [id number]  [id number]

MARY/CIARA